# Patient Record
Sex: MALE | Race: OTHER
[De-identification: names, ages, dates, MRNs, and addresses within clinical notes are randomized per-mention and may not be internally consistent; named-entity substitution may affect disease eponyms.]

---

## 2016-12-30 NOTE — RADRPT
PROCEDURE:   XR  Right Hip. 

 

CLINICAL INDICATION:   Trauma.  Right hip pain. 

 

TECHNIQUE:   Two views.  Frontal and lateral. 

 

COMPARISON:   CT scan of the abdomen and pelvis dated 05/23/2016. 

 

FINDINGS:

There is an acute intertrochanteric comminuted fracture of the right hip with marked varus deformity
.  There is no other fracture and there is no dislocation.

There is a large bladder calculus as seen previously measuring 5.0 x 3.3 cm.

Articular surfaces are intact.

There is no lytic or blastic lesion.

There is no radiopaque foreign body. 

 

IMPRESSION:

1.  Acute comminuted intertrochanteric fracture of the right hip with marked varus deformity.

2.  Large bladder calculus as seen on prior CT scan.

3.  Otherwise unremarkable study. 

 

RPTAT: QQ

_____________________________________________ 

.Aguila Lazaro MD, MD           Date    Time 

Electronically viewed and signed by .Aguila Lazaro MD, MD on 12/30/2016 14:24 

 

D:  12/30/2016 14:24  T:  12/30/2016 14:24

.R/

## 2016-12-30 NOTE — ERA
ER Documentation


Chief Complaint


Date/Time


DATE: 12/30/16 


TIME: 14:31


Chief Complaint


right leg pain from a fall 3 days ago . no deformity noted.





HPI


This is 61-year-old male who says he rolled out of bed this morning and landed 

on his right hip.  He is complaining of sharp pain in the right hip/groin 

region.  He said he was not knocked unconscious and did not pass out.  He is 

describing a sharp pain in the right hip area is worse with movement and better 

with rest.  He is not able to bear weight or ambulate.  Denies any neck pain or 

other injury.  Patient has a history of ileostomy and colostomy with a fistula 

to the bladder





ROS


All systems reviewed and are negative except as per history of present illness.





Medications


Home Meds


Active Scripts


Ondansetron Hcl* (Zofran*) 4 Mg Tablet, 4 MG PO Q8H Y for NAUSEA AND/OR VOMITING

, #3 TAB


   Prov:RICHSOHA THOMPSON         5/22/16


Reported Medications


Multivitamins* (Once Daily*) 1 Tab Tablet, 1 TAB PO DAILY, TAB


   6/2/16


Loperamide Hcl* (Loperamide Hcl*) 2 Mg Cap, 2 MG PO BID, CAP


   6/2/16


Hydrocodone Bit-Acetaminophen* (Norco*)  Mg Tablet, 1 TAB PO TID Y for 

PAIN, TAB


   6/2/16


Ciprofloxacin Hcl* (Ciprofloxacin Hcl*) 500 Mg Tablet, 500 MG PO BID, #14 TAB


   THIS IS AN ON GOING MEDICATION


   6/2/16


Amox Tr/Potassium Clavulanate (Amox Tr-K Clv 500-125 Mg Tab) 1 Tab Tablet, 1 

TAB PO TID, #30 TAB


   5/23/16


Sulfamethoxazole/Trimethoprim (Smz-Tmp Ds 800-160 Mg Tablet) 1 Tab Tablet, 1 

TAB PO BID WITH MEALS , TAB


   5/23/16


Quetiapine Fumarate* (Seroquel*) 25 Mg Tablet, 25 MG PO HS, #30 TAB


   5/22/16


Gabapentin* (Gabapentin*) 300 Mg Capsule, 300 MG PO TID, #90 CAP


   5/22/16


Zolpidem Tartrate* (Ambien*) 10 Mg Tablet, 10 MG PO QHS Y for INSOMNIA, TAB


   5/22/16


Mirtazapine* (Remeron*) 15 Mg Tablet, 7.5 MG PO HS, TAB


   5/22/16


Lorazepam* (Ativan*) 0.5 Mg Tablet, 0.5 MG PO TID Y for ANXIETY, #30 TAB


   5/22/16


Aspirin* (Aspirin* Chew) 81 Mg Tab.chew, 81 MG PO DAILY, TAB.CHEW


   5/22/16


Fluconazole* (Fluconazole*) 100 Mg Tablet, 100 MG PO DAILY, TAB


   5/22/16


Tamsulosin Hcl* (Flomax*) 0.4 Mg Cap.er.24h, 0.4 MG PO HS, CAP


   5/22/16


Finasteride* (Finasteride*) 5 Mg Tablet, 5 MG PO DAILY, TAB


   5/22/16


Ferrous Sulfate* (Ferrous Sulfate*) 325 Mg Tabec, 325 MG PO TID, TAB


   5/22/16


Discontinued Scripts


Cephalexin* (Cephalexin*) 500 Mg Capsule, 500 MG PO Q6, #40 CAP


   Prov:HAYDEE SAINI PA-C         8/24/16


Sulfamethoxazole-Trimethoprim* (Bactrim* DS) 800-160 Mg Tab, 1 TAB PO BID, #20 

TAB


   Prov:HAYDEE SAINI PA-C         8/24/16





Allergies


Allergies:  


Coded Allergies:  


     No Known Allergy (Unverified , 5/26/16)





PMhx/Soc


History of Surgery:  Yes (COLOSTOMY AND OSTOMY 2013)


Anesthesia Reaction:  No


Hx Neurological Disorder:  No


Hx Respiratory Disorders:  No


Hx Cardiac Disorders:  No


Hx Psychiatric Problems:  No


Hx Miscellaneous Medical Probl:  Yes (kidney stone, BLADDER INFECTION )


Hx Alcohol Use:  Yes (quit 35 yrs ago)


Hx Substance Use:  No


Hx Tobacco Use:  No





FmHx


Family History:  No coronary disease





Physical Exam


Vitals





Vital Signs








  Date Time  Temp Pulse Resp B/P Pulse Ox O2 Delivery O2 Flow Rate FiO2


 


12/30/16 14:32 98.5 74 20 129/68 98 Room Air  


 


12/30/16 13:20 98.5 80 20 127/80 97   








Physical Exam


Const:      Well-developed, well-nourished


 Head:        Atraumatic, normocephalic


 Eyes:       Normal Conjunctiva, PERRLA, EOMI, normal sclera, no nystagmus


 ENT:         Normal External Ears, Nose and Mouth, moist mucus membranes.


 Neck:        Full range of motion.  No meningismus, no lymphadenopathy.


 Resp:         Clear to auscultation bilaterally, no wheezing, rhonchi, rales


 Cardio:       Regular rate and rhythm, no murmurs, S1 S2 present


 Abd:         Soft, non tender x 4, non distended.  Ileostomy and colostomy bag 

in place, normal bowel sounds, no guarding or rebound, no pulsitile abdominal 

masses or bruits


 Skin:         No petechiae or rashes, no ecchymosis , no maculopapular rash


 Back:        No midline or flank tenderness


 Ext:          No cyanosis, or edema, FROM x 3, tenderness to the right hip 

there is limited range of motion of the hip due to pain, normal inspection, 

neurovascularly intact x 4


 Neur:        Awake and alert, STR 5/5 x 4, sensation intact x 4, no focal 

findings, cerebellum intact


 Psych:        Normal Mood and Affect


Results 24 hrs





 Current Medications








 Medications


  (Trade)  Dose


 Ordered  Sig/Doug


 Route


 PRN Reason  Start Time


 Stop Time Status Last Admin


Dose Admin


 


 Acetaminophen/


 Hydrocodone Bitart


  (Norco (5/325))  2 tab  ONCE  ONCE


 PO


   12/30/16 13:30


 12/30/16 13:31 DC 12/30/16 13:43


 


 


 Hydromorphone HCl


  (Dilaudid)  1 mg  ONCE  STAT


 IV


   12/30/16 14:27


 12/30/16 14:30 DC  


 


 


 Ondansetron HCl


  (Zofran Inj)  4 mg  ONCE  STAT


 IV


   12/30/16 14:27


 12/30/16 14:30 DC  


 











Procedures/MDM


PROCEDURE:   XR  Right Hip. 


 


CLINICAL INDICATION:   Trauma.  Right hip pain. 


 


TECHNIQUE:   Two views.  Frontal and lateral. 


 


COMPARISON:   CT scan of the abdomen and pelvis dated 05/23/2016. 


 


FINDINGS:


There is an acute intertrochanteric comminuted fracture of the right hip with 

marked varus deformity.  There is no other fracture and there is no dislocation.


There is a large bladder calculus as seen previously measuring 5.0 x 3.3 cm.


Articular surfaces are intact.


There is no lytic or blastic lesion.


There is no radiopaque foreign body. 


 


IMPRESSION:


1.  Acute comminuted intertrochanteric fracture of the right hip with marked 

varus deformity.


2.  Large bladder calculus as seen on prior CT scan.


3.  Otherwise unremarkable study. 


 


RPTAT: QQ


_____________________________________________ 


.Aguila Lazaro MD, MD           Date    Time 


Electronically viewed and signed by .Aguila Lazaro MD, MD on 12/30/2016 14:24 


 


D:  12/30/2016 14:24  T:  12/30/2016 14:24


.R/





CC: WENDY ROD DO

















EKG: 


Rate/Rhythm:             Normal Sinus Rhythm,NL intervals


QRS, ST, QT:    NORMAL [place], QRS, QT]


Impression:      NORMAL EKG














[facility] on call and will admit to the hospital for ORIF of the right 

hip





Departure


Diagnosis:  


 Primary Impression:  


 Intertrochanteric fracture of right hip


 Qualified Code:  S72.141A - Intertrochanteric fracture of right hip, closed, 

initial encounter


Condition:  Stable











WENDY ROD DO Dec 30, 2016 14:33

## 2016-12-30 NOTE — HP
Date/Time of Note


Date/Time of Note


DATE: 12/30/16 


TIME: 16:03





Assessment/Plan


VTE Prophylaxis


VTE Prophylaxis Intervention:  SCD's





Lines/Catheters


IV Catheter Type (from Nrsg):  Saline Lock





Assessment/Plan


Assessment/Plan


60 yo male with a past medical history of essential hypertension, dyslipidemia, 

ileostomy/colostomy, BPH, iron deficiency anemia, insomnia, who came in 

secondary to sustaining a fall 2 nights ago.





1. Acute comminuted intertrochanteric fracture of the right hip - will admit 

the patient to med/surg, fall/hip precautions, consult ortho, pain management, 

bowel regimen, will obtain cardiac clearance for surgical intervention


2. Leukocytosis - multifactorial - fistula related plus recent hip fall - will 

start on zosyn, blood cultures


3. CKD - will consult nephrology for further evaluation and treatment, avoid 

nephrotoxins, renally adjust medications


4. Anemia of chronic disease - will continue with ferrous sulfate


5. Essential hypertension - hydralazine prn for sbp > 160


6. BPH - continue with finasteride/flomax


7. Colostomy/ileostomy - possible fistula - broaden spec antibiotics


8. Insomnia - continue with remeron


9. Dyslipidemia - continue with statin


10. GI ppx - pepcid


11. DVT ppx - scds





answered all of his questions. as per clinical course. 





this history and physical took greater then 45 minutes to complete





HPI/ROS


Admit Date/Time


Admit Date/Time


12/30/2016, 4:03 pm





Hx of Present Illness


60 yo male with a past medical history of essential hypertension, dyslipidemia, 

ileostomy/colostomy, BPH, iron deficiency anemia, insomnia, who came in 

secondary to sustaining a fall 2 nights ago. He was sleeping, and rolled over 

to the floor, hitting on the right hip. The reason he came in late is because 

he thought he bruised his hip and was able to move his lower extremities. 

Otherwise he denies and chest pain, loss of consciousness, chest pain, 

shortness of breath, headaches, urinary/bowel irregularities, fevers/chills, 

nausea/vomiting/diarrhea/constipation, or other constitutional symptoms. 

Patient did have a recent colostomy, possible fistula, and was started on 

diflucan and bactrim DS. 





ER course: pain management





ROS


14 point review of systems completed, please refer to HPI for any positive 

findings





PMH/Family/Social


Past Medical History


colostomy/ileostomy, insomnia, BPH, iron deficiency anemia,


Medical History:  high cholesterol, hypertension





Past Surgical History


s/p colostomy/ileostomy





Family History


Significant Family History:  hypertension (father)





Social History


Alcohol Use:  sober


Smoking Status:  Current every day smoker


Drug Use:  none





Exam/Review of Systems


Vital Signs


Vitals





 Vital Signs








  Date Time  Temp Pulse Resp B/P Pulse Ox O2 Delivery O2 Flow Rate FiO2


 


12/30/16 15:30  88 20 100/88 98 Room Air  


 


12/30/16 14:32 98.5       











Exam


Exam


Gen Abisai: mild distress 2/2 to right hip pain, AAOx4


HEENT: NC/AT, PERRLA, EOMI, no pharyngeal erythema, no tonsillar exudates, no 

lymphadenopathy, no JVD, no carotid bruits


NECK: supple, no thyromegaly


THORAX: symmetrical, no obvious deformities


CV: S1S2, RRR, no M/G/R


Lungs: CTAB no W/C/R/R


Abd: soft, NT/ND, +BS, no rebound, no guarding, neg HSM, colostomy c/d/i


EXT: right hip truncated, externally rotated, has good proprioception and 

pulses in lower extremity, pain with movement and passive movement, left lower 

extremity no edema/ecchymoses


Neuro: CN II-XII grossly intact, no focal deficits


Psych: good mentation, alert and oriented, good mood and affect


Skin: C/D/I





Labs


Result Diagram:  


12/30/16 1455                                                                  

              12/30/16 1455








Medications


Medications





 Current Medications


Sodium Chloride (NS) 1,000 ml @  80 mls/hr [id number] IV  Last administered on 12/ 30/16at 15:24; Admin Dose 80 MLS/HR;  Start 12/30/16 at 15:15;  Stop 12/31/16 

at 03:44





Procedures


Procedures


Xray right hip


 


IMPRESSION:


1.  Acute comminuted intertrochanteric fracture of the right hip with marked 

varus deformity.


2.  Large bladder calculus as seen on prior CT scan.


3.  Otherwise unremarkable study. 





CT abd/pelvis


IMPRESSION:


 


1.  Moderately dilated multiple loops of proximal small bowel with transition 

in the region of the right lower quadrant ileostomy suggestive of small bowel 

obstruction.  Small bowel follow-through would be helpful for further 

evaluation. Small bowel loops appear to be adherent to the anterior abdominal 

wall as well as to the each other in the pelvis suggesting adhesions.


2.  Left lower quadrant colostomy without complicating features.  


3. Diastasis of the rectus abdominus muscle with small fat containing 

periumbilical hernia.  No drainable fluid collection in the anterior abdominal 

wall. 


4.  Moderate bilateral hydroureteronephrosis without obstructing ureteral 

stone.  Atrophic left kidney.


5.  Large calculus in the urinary bladder.


6.  Colonic diverticulosis without evidence of acute diverticulitis.





CXR


IMPRESSION:


 


Calcification of the thoracic aorta (consistent with atherosclerosis). 


Right diaphragmatic elevation


Bibasilar subsegmental atelectasis











MARCK SALAZAR MD Dec 30, 2016 16:16

## 2016-12-30 NOTE — RADRPT
PROCEDURE:   XR Chest. 

 

CLINICAL INDICATION:   Chest pain 

 

TECHNIQUE:   Chest AP portable 

 

COMPARISON:   None available 

 

FINDINGS:

 

The mediastinal structures are unremarkable.  There is calcification of the thoracic aorta (consiste
nt with atherosclerosis).  The heart is normal in size and configuration.  The pulmonary vascularity
 is normal.  There is right diaphragmatic elevation.  There is bibasilar subsegmental atelectasis.  
No consolidation is identified.  The pleural spaces are unremarkable.  The osseous structures are un
remarkable.  

 

IMPRESSION:

 

Calcification of the thoracic aorta (consistent with atherosclerosis). 

Right diaphragmatic elevation

Bibasilar subsegmental atelectasis

 

 

 

RPTAT: HGDB

_____________________________________________ 

.Nishant Rivera MD, MD           Date    Time 

Electronically viewed and signed by .Nishant Rivera MD, MD on 12/30/2016 15:20 

 

D:  12/30/2016 15:20  T:  12/30/2016 15:20

.B/

## 2016-12-31 NOTE — CONS
DATE OF ADMISSION: 12/30/2016

DATE OF CONSULTATION:  

 

 

 

TYPE OF CONSULTATION:  Nephrology.

 

REASON FOR CONSULTATION:  Acute kidney injury.

 

PHYSICIAN REQUESTING CONSULT:  Dr. Lux

 

HISTORY OF PRESENT ILLNESS:  This is a 61-year-old male with a past medical history of chronic kidne
y disease stage III, with a baseline creatinine around 1.6-2 mg/dL, a history of hypertension, dysli
pidemia, a history of colostomy, BPH, iron deficiency, insomnia, who presented to [facility] after sustaining a fall 2 days ago.  The patient was sleeping, rolled over, then fell on 
his right hip, developing pain.  The patient's symptoms progressed, without improvement.  As a resul
t, he came to the emergency room for evaluation.  Upon arrival the patient had x-rays of the right h
ip, which showed an acute intertrochanteric fracture of the right hip, with varus deformity.  The [facility] also had a CT scan of the abdomen and pelvis which showed dilated proximal small bowel loops, 
suggestive of obstruction, a large calculus in the urinary bladder, and moderate bilateral hydrouret
eronephrosis, without  obstructing ureteral stone, atrophic left kidney.  A chest x-ray was obtained
 which showed calcification of the aorta.  The patient was started on IV fluids and admitted to [facility]/surgical for evaluation.  Overnight the patient was clinically stable. There were no episodes of
 hemoptysis, hematemesis or hematochezia.

 

In terms of the patient's renal history, the patient has underlying chronic kidney disease.  He also
 has a history of an atrophic left kidney.  The patient does not know what his baseline creatinine i
s, or his baseline EGFR, but he denies any recent episodes of rashes, any frothy urine, any dysuria.
  The patient denies any recent NSAID use or any diuretic use.

 

PAST MEDICAL HISTORY:  As stated above, a history of hypertension, a history of BPH, anemia, dyslipi
demia, a history of CKD, a history of colostomy.

 

PAST SURGICAL HISTORY:  Status post colostomy and ileostomy, status post bowel resection.

 

ALLERGIES:  NO KNOWN DRUG ALLERGIES.

 

FAMILY HISTORY:  No family history of kidney disease or heart disease.

 

SOCIAL HISTORY:  He does not drink, smoke or do drugs.

 

MEDICATIONS:  The patient medications have been reviewed.

 

REVIEW OF SYSTEMS:  A 14-point review of systems was conducted.  Pertinent positives as stated in th
e HPI, otherwise negative.

 

PHYSICAL EXAMINATION:

VITAL SIGNS:  Blood pressure is 110/57, respirations 18, pulse 76, temperature 99.3.

HEENT:  Head is normocephalic.

NECK:  Supple.

HEART:  Regular rate.

LUNGS:  Showed diminished breath sounds at the base.

ABDOMEN:  Soft, nontender to palpation.  No rebound or guarding. There is a noted colostomy.

EXTREMITIES:  Negative for clubbing or cyanosis.  No edema.

DERMATOLOGIC:  No rashes.

MUSCULOSKELETAL:  Have no joint effusion.

NEUROLOGIC:  No focal deficits.

 

LABORATORY DATA:  Showed the patient's sodium is 139, potassium 4.7, BUN 34, creatinine 2.13.  White
 count 11.8, hemoglobin 8.1, hematocrit 25.5, platelet count is 578.

 

ASSESSMENT AND PLAN:

1.  Nonoliguric acute kidney injury on top of chronic kidney disease stage III, with a baseline crea
tinine of 1.6-2.0 mg/dL.  Etiology of acute kidney injury is secondary to hemodynamics.  The patient
's renal function has improved after receiving IV fluids.  At this point will check a UA with microa
nalysis.  Check urine electrolytes, check a renal ultrasound to evaluate renal parenchyma.  Otherwis
e continue supportive care, renally dose all meds, avoid nephrotoxins.

2.  Mineral bone disorder.  Monitor calcium and phosphorus levels.  No need for phosphate binders.

3.  Anemia.  Continue to monitor hemoglobin and hematocrit levels. Transfuse PRBC as needed.

4.  Acute right hip fracture.  The patient is being evaluated for possible surgery.  Orthopedic cons
ult is pending.

5.  Leukocytosis.  Etiology may be secondary to infection or stress.  The patient is on empiric anti
biotics.  Will review cultures and monitor.

6.  Hypertension.  Continue the current blood pressure regimen.

7.  Benign prostatic hypertrophy.  Continue the current medical management.

8.  History of colostomy with a previous history of fistula. Continue to monitor closely.  The patie
nt is on broad spectrum antibiotics

9.  Dyslipidemia.  Continue statin therapy.

 

Thank you, Dr. Lux, for this patient's consult.  It will be a pleasure to follow the patient wit
h you throughout the hospital course.

 

 

Dictated By: ANIKET PHAN DO

 

NR/JOHN

DD:    12/31/2016 12:21:17

DT:    12/31/2016 14:20:10

Conf#: [id number]

DID#:  [id number]

## 2016-12-31 NOTE — CONS
DATE OF ADMISSION: 12/30/2016

DATE OF CONSULTATION:  12/31/2016

 

 

 

CHIEF COMPLAINT:  Right hip pain.

 

HISTORY OF PRESENT ILLNESS:  This is a 61-year-old male who states that he fell out of bed 2 days ag
o.  He had pain, but was ambulating until yesterday, when his pain increased.  He is complaining of 
pain in his right groin.  The pain does not radiate.  He is unable to ambulate.  He denies any loss 
of consciousness.  He denies any recent fevers or chills.  He has no other complaints.

 

PAST MEDICAL HISTORY:  Bladder infection, kidney stones.

 

PAST SURGICAL HISTORY:  Colostomy in 2013.

 

MEDICATIONS:  Please see chart.

 

SOCIAL HISTORY:  The patient denies current tobacco, alcohol or drug use.

 

FAMILY HISTORY:  Noncontributory.

 

ALLERGIES:  NO KNOWN DRUG ALLERGIES.

 

PHYSICAL EXAMINATION:

VITAL SIGNS: Temperature 98.5, blood pressure 129/68, pulse of 74, respiratory rate of 20.

GENERAL:  The patient is in no acute distress.

EXTREMITIES:  Right lower extremity, there is shortening of the right lower extremity.  He has pain 
with axial loading. The calf is soft, nontender, with a negative Homans.  He has a palpable dorsalis
 pedis pulse.

 

X-rays of right hip:  Two views of the right hip demonstrated a displaced oblique fracture of the ri
ght intertrochanteric femur.  No other fractures or dislocations are seen.

 

ASSESSMENT: A 61-year-old male who fell, sustaining a right oblique displaced intertrochanteric femu
r fracture.

 

PLAN:  The patient will be nonweightbearing on the right lower extremity.  He will be optimized for 
surgical intervention.  I discussed the risks associated with the surgery, which include, but are no
t limited to, infection, venous thrombosis, pulmonary embolism, damage to neurovascular structures, 
 malunion, nonunion, need for revision surgery, need for blood transfusion, and even death.  The pat
ient understands the risks and would like to proceed with surgery.  I also explained to him that the
re is up to a 35% 1-year mortality.  The plan will be to perform a right hip open reduction internal
 fixation on Monday, 01/02/2017.

 

 

Dictated By: CARMEN DAVIS/JOHN

DD:    12/31/2016 10:57:51

DT:    12/31/2016 13:12:41

Conf#: [id number]

DID#:  [id number]

## 2016-12-31 NOTE — PN
Date/Time of Note


Date/Time of Note


DATE: 12/31/16 


TIME: 08:42





Assessment/Plan


VTE Prophylaxis


VTE Prophylaxis Intervention:  SCD's





Lines/Catheters


IV Catheter Type (from Nrsg):  Peripheral IV





Assessment/Plan


Assessment/Plan


62 yo male with a past medical history of essential hypertension, dyslipidemia, 

ileostomy/colostomy, BPH, iron deficiency anemia, insomnia, who came in 

secondary to sustaining a fall 2 nights ago.





1. Acute comminuted intertrochanteric fracture of the right hip - awaiting 

ortho recs, c/w pain management - surgery


2. Leukocytosis - multifactorial - fistula related plus recent hip fall - will 

start on zosyn, blood cultures - improving


3. CKD - will consult nephrology for further evaluation and treatment, avoid 

nephrotoxins, renally adjust medications - improving - 2/2 ATN


4. Anemia of chronic disease - will continue with ferrous sulfate, dilutional 

vs acute loss - will check occult blood stool - if < 8 g/dL will transfuse 2 U 

PRBCs


5. Essential hypertension - hydralazine prn for sbp > 160


6. BPH - continue with finasteride/flomax


7. Colostomy/ileostomy - possible fistula - broaden spec antibiotics


8. Insomnia - continue with remeron


9. Dyslipidemia - continue with statin


10. GI ppx - pepcid


11. DVT ppx - scds





dispo -f/u recs, check stat H/H, as per clinical course





this progress note took greater than 40 minutes to complete





Subjective


24 Hr Interval Summary


Free Text/Dictation


Patient was admitted yesterday for acute fracture of right hip. He is doing 

well with pain management. No occult blood in stool as per the patient. Spoke 

to him in regards to the care plan. 15 minutes spent.





Exam/Review of Systems


Vital Signs


Vitals





 Vital Signs








  Date Time  Temp Pulse Resp B/P Pulse Ox O2 Delivery O2 Flow Rate FiO2


 


12/31/16 00:42 98.1 94 20 111/65 95 Room Air  














 Intake and Output   


 


 12/30/16 12/30/16 12/31/16





 15:00 23:00 07:00


 


Intake Total  250 ml 1750 ml


 


Output Total   500 ml


 


Balance  250 ml 1250 ml











Exam


Gen Abisai: mild distress 2/2 to right hip pain, AAOx4


HEENT: NC/AT, PERRLA, EOMI, no pharyngeal erythema, no tonsillar exudates, no 

lymphadenopathy, no JVD, no carotid bruits


NECK: supple, no thyromegaly


THORAX: symmetrical, no obvious deformities


CV: S1S2, RRR, no M/G/R


Lungs: CTAB no W/C/R/R


Abd: soft, NT/ND, +BS, no rebound, no guarding, neg HSM, colostomy/ileostomy c/d

/i - fecal matter seen, no blood


EXT: right hip truncated, externally rotated, has good proprioception and 

pulses in lower extremity, pain with movement and passive movement, left lower 

extremity no edema/ecchymoses


Neuro: CN II-XII grossly intact, no focal deficits


Psych: good mentation, alert and oriented, good mood and affect


Skin: C/D/I





Results


Result Diagram:  


12/31/16 0538                                                                  

              12/31/16 0538





Results 24 hrs





Laboratory Tests








Test


  12/30/16


14:55 12/30/16


22:32 12/31/16


05:38


 


Activated Partial


Thromboplast Time 30.0  


  


  


 


 


Alanine Aminotransferase


(ALT/SGPT) 30  


  


  


 


 


Albumin 4.1    


 


Albumin/Globulin Ratio 0.87    


 


Alkaline Phosphatase 180  H  


 


Anion Gap 22  H  18  H


 


Anisocytosis 1+    


 


Aspartate Amino Transf


(AST/SGOT) 22  


  


  


 


 


Blood Morphology Comment       


 


Blood Urea Nitrogen 40  H  34  H


 


Calcium Level 10.2    8.9  


 


Carbon Dioxide Level 23    21  


 


Chloride Level 97    105  


 


Cholesterol Level 304  H  


 


Cholesterol/HDL Ratio 7.2    


 


Creatine Kinase 32   28   


 


Creatine Kinase Index 5.5   5.6   


 


Creatinine 2.68  H  2.13  H


 


Creatinine Kinase MB (Mass) 1.75   1.56   


 


Direct Bilirubin 0.00    


 


Globulin 4.70  H  


 


Glucose Level 113    75  


 


HDL Cholesterol 42    


 


Hematocrit 29.8  L  23.9  L


 


Hemoglobin 9.4  L  7.8  L


 


Hemoglobin A1c 5.5    


 


INR International Normalized


Ratio 0.92  


  


  


 


 


Indirect Bilirubin 0.0    


 


LDL Cholesterol, Calculated 196    


 


Lymphocytes # 1.2    0.9  


 


Lymphocytes % 7.0  L  7.3  L


 


Magnesium Level 2.0    


 


Mean Corpuscular Hemoglobin 24.6  L  25.2  L


 


Mean Corpuscular Hemoglobin


Concent 31.5  L


  


  32.4  


 


 


Mean Corpuscular Volume 78.2  L  77.8  L


 


Mean Platelet Volume 6.2  L  6.2  L


 


Monocytes # 0.9    0.8  


 


Monocytes % 5.0    6.9  


 


Myelocytes # 0.2    


 


Myelocytes % 1.0  H  


 


Neutrophils # 15.5  H  9.9  H


 


Neutrophils % 87.0  H  83.8  H


 


Ovalocytes FEW    


 


Platelet Count 832  #H  578  #H


 


Platelet Estimate


  PLT APPEAR


INCREASED 


  


 


 


Potassium Level 5.3  H  4.7  


 


Prothrombin Time 12.4    


 


Prothrombin Time Ratio 1.0    


 


Red Blood Count 3.81  L  3.08  L


 


Red Cell Distribution Width 17.5  H  17.0  H


 


Sodium Level 137    139  


 


Spherocytes FEW    


 


Stomatocytes     


 


Thyroid Stimulating Hormone


(TSH) 0.996  


  


  


 


 


Total Bilirubin 0.0  L  


 


Total Protein 8.8  H  


 


Triglycerides Level 330  H  


 


Troponin I < 0.012   < 0.012   


 


White Blood Count 17.8  #H  11.8  #H


 


Basophils #   0.0  


 


Basophils %   0.4  


 


Eosinophils #   0.2  


 


Eosinophils %   1.6  


 


Nucleated Red Blood Cells #   0.0  


 


Nucleated Red Blood Cells %   0.0  











Medications


Medications





 Current Medications


Sodium Chloride (NS) 1,000 ml @  75 mls/hr [id number] IV  Last administered on 12/ 31/16at 05:14; Admin Dose 75 MLS/HR;  Start 12/30/16 at 15:53


Ondansetron HCl (Zofran Inj) 4 mg Q6H  PRN IV NAUSEA AND/OR VOMITING;  Start 12/ 30/16 at 16:00


Acetaminophen (Tylenol Tab) 650 mg Q6H  PRN PO PAIN LEVEL 1-3 OR FEVER;  Start 

12/30/16 at 16:00


Acetaminophen/ Hydrocodone Bitart (Norco (5/325)) 1 tab Q6H  PRN PO MODERATE 

PAIN LEVEL 4-6;  Start 12/30/16 at 16:00


Morphine Sulfate (morphine) 2 mg Q4H  PRN IV SEVERE PAIN LEVEL 7-10 Last 

administered on 12/31/16at 05:22; Admin Dose 2 MG;  Start 12/30/16 at 16:00


Docusate Sodium (Colace) 100 mg Q12H  PRN PO CONSTIPATION;  Start 12/30/16 at 16

:00


Famotidine (Pepcid Iv) 20 mg Q12 IV  Last administered on 12/30/16at 21:08; 

Admin Dose 20 MG;  Start 12/30/16 at 21:00


Aspirin (Aspirin) 81 mg DAILY PO ;  Start 12/31/16 at 09:00


Ferrous Sulfate (Ferrous Sulfate (Ec)) 325 mg TID PO  Last administered on 12/30 /16at 21:07; Admin Dose 325 MG;  Start 12/30/16 at 21:00


Finasteride (Proscar) 5 mg DAILY PO ;  Start 12/31/16 at 09:00


Fluconazole (Diflucan) 100 mg DAILY PO ;  Start 12/31/16 at 09:00


Gabapentin (Neurontin) 300 mg TID PO  Last administered on 12/30/16at 21:07; 

Admin Dose 300 MG;  Start 12/30/16 at 21:00


Loperamide HCl (Imodium Cap) 2 mg BID PO  Last administered on 12/30/16at 21:07

; Admin Dose 2 MG;  Start 12/30/16 at 21:00


Lorazepam (Ativan) 0.5 mg TID  PRN PO ANXIETY;  Start 12/30/16 at 16:00


Mirtazapine (Remeron) 7.5 mg HS PO  Last administered on 12/30/16at 21:07; 

Admin Dose 7.5 MG;  Start 12/30/16 at 21:00


Multivitamins Therapeutic (Theragran) 1 tab DAILY PO ;  Start 12/31/16 at 09:00


Quetiapine Fumarate (Seroquel) 25 mg HS PO  Last administered on 12/30/16at 21:

07; Admin Dose 25 MG;  Start 12/30/16 at 21:00


Trimethoprim/ Sulfamethoxazole (Bactrim (Ds)) 1 tab BID PO  Last administered 

on 12/30/16at 21:07; Admin Dose 1 TAB;  Start 12/30/16 at 21:00


Tamsulosin HCl (Flomax) 0.4 mg HS PO  Last administered on 12/30/16at 21:07; 

Admin Dose 0.4 MG;  Start 12/30/16 at 21:00


Zolpidem Tartrate 10 mg 10 mg QHS  PRN PO INSOMNIA Last administered on 12/30/ 16at 21:08; Admin Dose 10 MG;  Start 12/30/16 at 16:00


Piperacillin Sod/ Tazobactam Sod (Zosyn 2.25gm/ 50ml (Pmx)) 50 ml @  100 mls/hr 

Q8 IVPB  Last administered on 12/31/16at 06:53; Admin Dose 100 MLS/HR;  Start 12 /30/16 at 17:30


Influenza Virus Vaccine (Fluzone) 0.5 ml ONCE ONCE IM* ;  Start 12/31/16 at 09:

00;  Stop 12/31/16 at 09:01











MARCK SALAZAR MD Dec 31, 2016 08:49

## 2016-12-31 NOTE — RADRPT
PROCEDURE:   US Retroperitoneum. 

 

CLINICAL INDICATION:   Elevated BUN and creatinine.  Acute kidney injury. 

 

TECHNIQUE:   Multiple sonographic images of the retroperitoneum were obtained.  Evaluation of the ki
dneys and bladder was performed as well as visualization of the aorta and other retroperitoneal stru
ctures using a curved array transducer.  The images were reviewed on a PACS workstation. 

 

COMPARISON:   CT dated 05/23/2016 

 

FINDINGS:

 

The kidneys are well visualized. 

The right kidney measures 9.9 cm in length.  

The left kidney measures 8.8 cm in length. 

The bilateral kidneys are hyperechoic which may indicate medical renal disease.

There is no evidence of renal mass or calculus.

There is mild right hydronephrosis, unchanged.

There is moderate right hydroureter, measuring 1.2 cm, unchanged.

There is cortical thinning of the left kidney, consistent with atrophy, unchanged..   

No perinephric fluid collection is seen.  

The aorta and IVC are of normal caliber.  

There is no evidence for aortic aneurysm.    

 

The bladder is distended and contains a 4.5 cm calculus, increased in size from previous exam.

The ureteral jets are not identified..  

 

 

IMPRESSION:

 

1.  Hyperechoic kidneys, suggesting medical renal disease.

2.  Significant atrophy of the left kidney, unchanged.

3.  Mild right hydronephrosis and hydroureter, unchanged.

4.  4.5 cm calculus in the urinary bladder, increased in size from previous exam.   

 

RPTAT: HLDM

_____________________________________________ 

.Stephen Barbosa MD, MD           Date    Time 

Electronically viewed and signed by .Stephen Barbosa MD, MD on 12/31/2016 21:41 

 

D:  12/31/2016 21:41  T:  12/31/2016 21:41

.M/

## 2016-12-31 NOTE — CONS
Date/Time of Note


Date/Time of Note


DATE: 12/31/16 


TIME: 12:10





Assessment/Plan


Assessment/Plan


Chief Complaint/Hosp Course


1. PREOPERATIVE RISK ASSESSMENT FOR Acute comminuted intertrochanteric fracture 

of the right hip


2. Leukocytosis - multifactorial - fistula related plus recent hip fall - will 

start on zosyn, blood cultures 


3. CKD 


4. Anemia of chronic disease


5. Essential hypertension 


6. BPH


7. Colostomy/ileostomy


8. Insomnia 


9. Dyslipidemia


10. GI ppx


11. DVT


Problems:  


Additional Assessment/Plan


1) Will order echo


2) atorvastatin





Consultation Date/Type/Reason


Admit Date/Time


12/30/2016, 4:03 pm


Date of Consultation:  Dec 31, 2016


Type of Consultation:  cv


Reason for Consultation


preop


Referring Provider:  MARCK SALAZAR MD





Hx of Present Illness


reports that he rolled over during night in bed with subsequent hip fracture. 

States he is able to walk 1 block, no chest pain, no sob, no syncope or near 

syncope , no palpitations


Constitutional:  no complaints


ENT:  no complaints


Respiratory:  no complaints


Cardiovascular:  no complaints


Gastrointestinal:  no complaints


Musculoskeletal:  restricted range of motion


Neurologic:  no complaints





Past Medical History


Medical History:  high cholesterol, hypertension





Family History


Significant Family History:  hypertension





Social History


Alcohol Use:  sober


Smoking Status:  Former smoker


Drug Use:  none





Exam/Review of Systems


Vital Signs


Vitals





 Vital Signs








  Date Time  Temp Pulse Resp B/P Pulse Ox O2 Delivery O2 Flow Rate FiO2


 


12/31/16 07:15 99.3 96 18 110/57 96 Room Air  














 Intake and Output   


 


 12/30/16 12/30/16 12/31/16





 15:00 23:00 07:00


 


Intake Total  250 ml 1750 ml


 


Output Total   500 ml


 


Balance  250 ml 1250 ml











Exam


Constitutional:  alert, oriented


Head:  atraumatic, normocephalic


Neck:  supple


Respiratory:  clear to auscultation


Cardiovascular:  regular rate and rhythm


Gastrointestinal:  soft


Musculoskeletal:  joint tenderness, range of motion


Extremities:  normal pulses





Results


Result Diagram:  


12/31/16 0955                                                                  

              12/31/16 0538





Results 24 hrs





Laboratory Tests








Test


  12/30/16


14:55 12/30/16


22:32 12/31/16


05:38 12/31/16


09:55


 


Activated Partial


Thromboplast Time 30.0  


  


  


  


 


 


Alanine Aminotransferase


(ALT/SGPT) 30  


  


  


  


 


 


Albumin 4.1     


 


Albumin/Globulin Ratio 0.87     


 


Alkaline Phosphatase 180  H   


 


Anion Gap 22  H  18  H 


 


Anisocytosis 1+     


 


Aspartate Amino Transf


(AST/SGOT) 22  


  


  


  


 


 


Blood Morphology Comment        


 


Blood Urea Nitrogen 40  H  34  H 


 


Calcium Level 10.2    8.9   


 


Carbon Dioxide Level 23    21   


 


Chloride Level 97    105   


 


Cholesterol Level 304  H   


 


Cholesterol/HDL Ratio 7.2     


 


Creatine Kinase 32   28    


 


Creatine Kinase Index 5.5   5.6    


 


Creatinine 2.68  H  2.13  H 


 


Creatinine Kinase MB (Mass) 1.75   1.56    


 


Direct Bilirubin 0.00     


 


Globulin 4.70  H   


 


Glucose Level 113    75   


 


HDL Cholesterol 42     


 


Hematocrit 29.8  L  23.9  L 25.5  L


 


Hemoglobin 9.4  L  7.8  L 8.1  L


 


Hemoglobin A1c 5.5     


 


INR International Normalized


Ratio 0.92  


  


  


  


 


 


Indirect Bilirubin 0.0     


 


LDL Cholesterol, Calculated 196     


 


Lymphocytes # 1.2    0.9   


 


Lymphocytes % 7.0  L  7.3  L 


 


Magnesium Level 2.0     


 


Mean Corpuscular Hemoglobin 24.6  L  25.2  L 


 


Mean Corpuscular Hemoglobin


Concent 31.5  L


  


  32.4  


  


 


 


Mean Corpuscular Volume 78.2  L  77.8  L 


 


Mean Platelet Volume 6.2  L  6.2  L 


 


Monocytes # 0.9    0.8   


 


Monocytes % 5.0    6.9   


 


Myelocytes # 0.2     


 


Myelocytes % 1.0  H   


 


Neutrophils # 15.5  H  9.9  H 


 


Neutrophils % 87.0  H  83.8  H 


 


Ovalocytes FEW     


 


Platelet Count 832  #H  578  #H 


 


Platelet Estimate


  PLT APPEAR


INCREASED 


  


  


 


 


Potassium Level 5.3  H  4.7   


 


Prothrombin Time 12.4     


 


Prothrombin Time Ratio 1.0     


 


Red Blood Count 3.81  L  3.08  L 


 


Red Cell Distribution Width 17.5  H  17.0  H 


 


Sodium Level 137    139   


 


Spherocytes FEW     


 


Stomatocytes      


 


Thyroid Stimulating Hormone


(TSH) 0.996  


  


  


  


 


 


Total Bilirubin 0.0  L   


 


Total Protein 8.8  H   


 


Triglycerides Level 330  H   


 


Troponin I < 0.012   < 0.012    


 


White Blood Count 17.8  #H  11.8  #H 


 


Basophils #   0.0   


 


Basophils %   0.4   


 


Eosinophils #   0.2   


 


Eosinophils %   1.6   


 


Nucleated Red Blood Cells #   0.0   


 


Nucleated Red Blood Cells %   0.0   











Medications


Medications





 Current Medications


Sodium Chloride (NS) 1,000 ml @  75 mls/hr [id number] IV  Last administered on 12/ 31/16at 05:14; Admin Dose 75 MLS/HR;  Start 12/30/16 at 15:53


Ondansetron HCl (Zofran Inj) 4 mg Q6H  PRN IV NAUSEA AND/OR VOMITING;  Start 12/ 30/16 at 16:00


Acetaminophen (Tylenol Tab) 650 mg Q6H  PRN PO PAIN LEVEL 1-3 OR FEVER;  Start 

12/30/16 at 16:00


Acetaminophen/ Hydrocodone Bitart (Norco (5/325)) 1 tab Q6H  PRN PO MODERATE 

PAIN LEVEL 4-6;  Start 12/30/16 at 16:00


Morphine Sulfate (morphine) 2 mg Q4H  PRN IV SEVERE PAIN LEVEL 7-10 Last 

administered on 12/31/16at 09:15; Admin Dose 2 MG;  Start 12/30/16 at 16:00


Docusate Sodium (Colace) 100 mg Q12H  PRN PO CONSTIPATION;  Start 12/30/16 at 16

:00


Famotidine (Pepcid Iv) 20 mg Q12 IV  Last administered on 12/31/16at 09:28; 

Admin Dose 20 MG;  Start 12/30/16 at 21:00


Aspirin (Aspirin) 81 mg DAILY PO ;  Start 12/31/16 at 09:00


Ferrous Sulfate (Ferrous Sulfate (Ec)) 325 mg TID PO  Last administered on 12/31 /16at 09:27; Admin Dose 325 MG;  Start 12/30/16 at 21:00


Finasteride (Proscar) 5 mg DAILY PO  Last administered on 12/31/16at 09:27; 

Admin Dose 5 MG;  Start 12/31/16 at 09:00


Fluconazole (Diflucan) 100 mg DAILY PO  Last administered on 12/31/16at 09:28; 

Admin Dose 100 MG;  Start 12/31/16 at 09:00


Gabapentin (Neurontin) 300 mg TID PO  Last administered on 12/31/16at 09:28; 

Admin Dose 300 MG;  Start 12/30/16 at 21:00


Loperamide HCl (Imodium Cap) 2 mg BID PO  Last administered on 12/30/16at 21:07

; Admin Dose 2 MG;  Start 12/30/16 at 21:00


Lorazepam (Ativan) 0.5 mg TID  PRN PO ANXIETY;  Start 12/30/16 at 16:00


Mirtazapine (Remeron) 7.5 mg HS PO  Last administered on 12/30/16at 21:07; 

Admin Dose 7.5 MG;  Start 12/30/16 at 21:00


Multivitamins Therapeutic (Theragran) 1 tab DAILY PO  Last administered on 12/31 /16at 09:27; Admin Dose 1 TAB;  Start 12/31/16 at 09:00


Quetiapine Fumarate (Seroquel) 25 mg HS PO  Last administered on 12/30/16at 21:

07; Admin Dose 25 MG;  Start 12/30/16 at 21:00


Trimethoprim/ Sulfamethoxazole (Bactrim (Ds)) 1 tab BID PO  Last administered 

on 12/31/16at 09:27; Admin Dose 1 TAB;  Start 12/30/16 at 21:00


Tamsulosin HCl (Flomax) 0.4 mg HS PO  Last administered on 12/30/16at 21:07; 

Admin Dose 0.4 MG;  Start 12/30/16 at 21:00


Zolpidem Tartrate 10 mg 10 mg QHS  PRN PO INSOMNIA Last administered on 12/30/ 16at 21:08; Admin Dose 10 MG;  Start 12/30/16 at 16:00


Piperacillin Sod/ Tazobactam Sod (Zosyn 2.25gm/ 50ml (Pmx)) 50 ml @  100 mls/hr 

Q8 IVPB  Last administered on 12/31/16at 06:53; Admin Dose 100 MLS/HR;  Start 12 /30/16 at 17:30





Procedures


Procedures


EKG: Sinustachycardia











MARYAM MNUOZ MD Dec 31, 2016 12:15

## 2017-01-01 NOTE — PN
DATE:  01/01/2017

 

 

SUBJECTIVE:  The patient is stable, no acute events overnight.  No fevers, chills, nausea, vomiting,
 no shortness of breath.

 

OBJECTIVE:

VITAL SIGNS:  Blood pressure is 119/65, respirations 20, pulse 77, temperature 99.6.

HEENT:  Head is normocephalic.

NECK:  Supple.

HEART:  Regular rate.

LUNGS:  Show diminished breath sounds at base.

ABDOMEN:  Soft, nontender to palpation without rebound or guarding.

EXTREMITIES:  Negative for clubbing, cyanosis, no edema.

DERMATOLOGIC:  No rashes.

MUSCULOSKELETAL:  No joint effusions.

NEUROLOGIC:  No change in exam.

 

MEDICATIONS:  The patient's medications have been reviewed.

 

LABORATORY DATA:  Shows sodium 136, potassium 4.8, BUN 27, creatinine 1.94.  White count 11.8, hemog
lobin 6.7, hematocrit 21.2, platelet count is 524.

 

IMAGING:  Renal ultrasound shows hyperechoic kidneys, mild right hydronephrosis.

 

ASSESSMENT AND PLAN:

1.  Nonoliguric acute kidney injury on top of chronic kidney disease stage, history of previous base
line creatinine 1.6 to 2 0 mg/dL.  Etiology of acute kidney injury is secondary to hemodynamics.  Re
nal function is improving with IV fluids.  The patient's renal ultrasound does show mild right-sided
 hydronephrosis.  At this point, continue current treatment plan.  Continue supportive care, renally
 dose all meds, avoid nephrotoxins.  Continue IV hydration.

2.  Mineral bone disorder.  Continue to monitor calcium and phosphorus levels.  No need for phosphat
e binders.

3.  Anemia.  Patient's hemoglobin levels declined likely from intertrochanteric bleed.  Continue to 
monitor H and H levels, transfuse if needed.

4.  Acute right hip fracture.  The patient is being evaluated by orthopedist for possible surgery.  
Continue to monitor.

5.  Leukocytosis.  The patient is on empiric antibiotics, continue to monitor.  Follow up cultures.

6.  Hypertension.  Continue current blood pressure regimen.

7.  Benign prostatic hypertrophy.  Continue medical management.

8.  History of colostomy with history of fistula.  Continue broad spectrum antibiotics.

9.  Dyslipidemia.  Continue statin therapy.

 

 

Dictated By: ANIKET LUCAS/JOHN

DD:    01/01/2017 11:10:36

DT:    01/01/2017 15:23:37

Conf#: [id number]

DID#:  [id number]

## 2017-01-01 NOTE — PN
DATE:  

 

 

SUBJECTIVE:  The patient states that his pain is controlled.  He has been nonweightbearing.  He has 
bilateral SCDs.  He has no complaints.

 

OBJECTIVE:  Right lower extremity pain with axial loading.  His calf is soft, nontender with a negat
claire Homans.  He has palpable dorsalis pedis pulse.

 

IMPRESSION:  A 61-year-old male with a right intertrochanteric femur fracture, and infected fistula 
with colostomy.  

 

PLAN:  I spoke with the [facility] hospitalist.  The patient has an active infection currently.  I explai
vinnie to him that I will not be able to do surgery until the infection has cleared.  He is contacting 
the patient's surgeon at [facility].  The patient may require transfer for further 
care at [facility].  I will continue to follow the patient along.

 

 

Dictated By: CARMEN DAVIS/JOHN

DD:    01/01/2017 13:17:08

DT:    01/01/2017 17:21:14

Conf#: [id number]

DID#:  [id number]

## 2017-01-01 NOTE — CONS
Date/Time of Note


Date/Time of Note


DATE: 1/1/17 


TIME: 17:58





Assessment/Plan


Assessment/Plan


Additional Assessment/Plan


Preoperative cardiac risk stratification


Hypertension


Left ventricular hypertrophy


Hip fracture


Abdominal fistula





-Patient with good exercise capacity, walking anywhere from one to 2 miles on a 

daily basis without exertional chest pain or shortness of breath. 

Echocardiogram with evidence of left ventricular hypertrophy but otherwise 

preserved ejection fraction. Patient at a low to intermediate risk for any 

untoward cardiac events for planned orthopedic surgery. The benefits of surgery 

likely outweigh the risks. Would continue statin therapy if no contraindication 

and restart aspirin when okay by our surgery colleagues.





Consultation Date/Type/Reason


Admit Date/Time


Dec 30, 2016 at 15:15


Initial Consult Date


12/31/16


Type of Consultation:  cv


Referring Provider:  MARCK SALAZAR MD





24 HR Interval Summary


Free Text/Dictation


Denies chest pain, shortness of breath





Exam/Review of Systems


Vital Signs


Vitals





 Vital Signs








  Date Time  Temp Pulse Resp B/P Pulse Ox O2 Delivery O2 Flow Rate FiO2


 


1/1/17 07:43 99.6 77 20 119/65 96   


 


12/31/16 20:00      Room Air  














 Intake and Output   


 


 12/31/16 12/31/16 1/1/17





 15:00 23:00 07:00


 


Intake Total 100 ml 2420 ml 1210 ml


 


Output Total  850 ml 1000 ml


 


Balance 100 ml 1570 ml 210 ml











Exam


No apparent distress


Constitutional:  alert, oriented


Head:  normocephalic


Neck:  supple


Respiratory:  clear to auscultation, normal air movement


Cardiovascular:  other (S1 and S2 heard), regular rate and rhythm


Gastrointestinal:  bowel sounds, non-tender, other (drainage at fistula site), 

soft


Extremities:  other (no edema)





Results


Result Diagram:  


1/1/17 0715                                                                    

            1/1/17 0515





Results 24 hrs





Laboratory Tests








Test


  1/1/17


05:15 1/1/17


07:15


 


Anion Gap 14   


 


Anisocytosis 1+   


 


Basophils # 0.0   


 


Basophils % 0.3   


 


Blood Morphology Comment    


 


Blood Urea Nitrogen 27  H 


 


Calcium Level 9.0   


 


Carbon Dioxide Level 20  L 


 


Chloride Level 107   


 


Creatinine 1.94  H 


 


Differential Comment AUTO w/SCAN   


 


Eosinophils # 0.2   


 


Eosinophils % 2.1   


 


Glucose Level 84   


 


Hematocrit 21.2  L 22.6  L


 


Hemoglobin 6.7  *L 7.2  L


 


Hypochromasia 2+   


 


Lymphocytes # 1.1   


 


Lymphocytes % 9.5  L 


 


Mean Corpuscular Hemoglobin 24.8  L 


 


Mean Corpuscular Hemoglobin


Concent 31.7  L


  


 


 


Mean Corpuscular Volume 78.2  L 


 


Mean Platelet Volume 6.2  L 


 


Microcytosis 1+   


 


Monocytes # 0.9   


 


Monocytes % 7.7   


 


Neutrophils # 9.5  H 


 


Neutrophils % 80.4  H 


 


Nucleated Red Blood Cells # 0.0   


 


Nucleated Red Blood Cells % 0.0   


 


Platelet Count 524  H 


 


Potassium Level 4.8   


 


Red Blood Count 2.71  L 


 


Red Cell Distribution Width 17.5  H 


 


Sodium Level 136   


 


White Blood Count 11.8  H 











Medications


Medications





 Current Medications


Sodium Chloride (NS) 1,000 ml @  75 mls/hr [id number] IV  Last administered on 1/1/ 17at 10:05; Admin Dose 75 MLS/HR;  Start 12/30/16 at 15:53


Ondansetron HCl (Zofran Inj) 4 mg Q6H  PRN IV NAUSEA AND/OR VOMITING;  Start 12/ 30/16 at 16:00


Acetaminophen (Tylenol Tab) 650 mg Q6H  PRN PO PAIN LEVEL 1-3 OR FEVER;  Start 

12/30/16 at 16:00


Acetaminophen/ Hydrocodone Bitart (Norco (5/325)) 1 tab Q6H  PRN PO MODERATE 

PAIN LEVEL 4-6 Last administered on 1/1/17at 00:36; Admin Dose 1 TAB;  Start 12/ 30/16 at 16:00


Morphine Sulfate (morphine) 2 mg Q4H  PRN IV SEVERE PAIN LEVEL 7-10 Last 

administered on 1/1/17at 13:54; Admin Dose 2 MG;  Start 12/30/16 at 16:00


Docusate Sodium (Colace) 100 mg Q12H  PRN PO CONSTIPATION;  Start 12/30/16 at 16

:00


Aspirin (Aspirin) 81 mg DAILY PO ;  Start 12/31/16 at 09:00


Ferrous Sulfate (Ferrous Sulfate (Ec)) 325 mg TID PO  Last administered on 1/1/ 17at 13:54; Admin Dose 325 MG;  Start 12/30/16 at 21:00


Finasteride (Proscar) 5 mg DAILY PO  Last administered on 1/1/17at 09:01; Admin 

Dose 5 MG;  Start 12/31/16 at 09:00


Fluconazole (Diflucan) 100 mg DAILY PO  Last administered on 1/1/17at 09:01; 

Admin Dose 100 MG;  Start 12/31/16 at 09:00


Gabapentin (Neurontin) 300 mg TID PO  Last administered on 1/1/17at 13:53; 

Admin Dose 300 MG;  Start 12/30/16 at 21:00


Loperamide HCl (Imodium Cap) 2 mg BID PO  Last administered on 12/30/16at 21:07

; Admin Dose 2 MG;  Start 12/30/16 at 21:00


Lorazepam (Ativan) 0.5 mg TID  PRN PO ANXIETY;  Start 12/30/16 at 16:00


Mirtazapine (Remeron) 7.5 mg HS PO  Last administered on 12/31/16at 20:35; 

Admin Dose 7.5 MG;  Start 12/30/16 at 21:00


Multivitamins Therapeutic (Theragran) 1 tab DAILY PO  Last administered on 1/1/ 17at 09:01; Admin Dose 1 TAB;  Start 12/31/16 at 09:00


Quetiapine Fumarate (Seroquel) 25 mg HS PO  Last administered on 12/31/16at 20:

35; Admin Dose 25 MG;  Start 12/30/16 at 21:00


Trimethoprim/ Sulfamethoxazole (Bactrim (Ds)) 1 tab BID PO  Last administered 

on 1/1/17at 09:01; Admin Dose 1 TAB;  Start 12/30/16 at 21:00


Tamsulosin HCl (Flomax) 0.4 mg HS PO  Last administered on 12/31/16at 20:35; 

Admin Dose 0.4 MG;  Start 12/30/16 at 21:00


Zolpidem Tartrate (Ambien) 10 mg QHS  PRN PO INSOMNIA Last administered on 12/31 /16at 20:36; Admin Dose 10 MG;  Start 12/30/16 at 16:00


Atorvastatin Calcium (Lipitor) 10 mg HS PO  Last administered on 12/31/16at 20:

34; Admin Dose 10 MG;  Start 12/31/16 at 21:00


Famotidine 20 mg 20 mg DAILY IV ;  Start 1/2/17 at 09:00


Piperacillin Sod/ Tazobactam Sod (Zosyn 2.25gm/ 50ml (Pmx)) 50 ml @  100 mls/hr 

Q6 IVPB  Last administered on 1/1/17at 14:49; Admin Dose 100 MLS/HR;  Start 1/1/ 17 at 13:30











Trey Patricia DO Jan 1, 2017 18:01

## 2017-01-01 NOTE — TS
DATE OF ADMISSION: 12/30/2016

DATE OF DISCHARGE: 01/01/2017

 

DIAGNOSIS ON TRANSFER:

1.  Acute comminuted intertrochanteric fracture of the right hip.

2.  Leukocytosis secondary to #1 and colovesicular fistula.

3.  Colovesicular fistula:  Chronic. The patient had an operation at [facility] in the past.

4.  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  The patient had a colon surgery with a right colostomy and a left
 blind loop.

5.  His surgical history is complicated. 

6.  Anemia of chronic disease.

7.  Essential hypertension.

8.  Benign prostatic hypertrophy.

9.  Insomnia.

10.  Dyslipidemia.

 

HOSPITAL COURSE:  The patient is a 61-year-old male who was admitted by Dr. Lux on 12/30/2016. T
he patient was placed on broad spectrum antibiotics because he had an elevated white blood cell coun
t and right intertrochanteric fracture.  On admission, his white count was 17,800 with a predominanc
e of neutrophils.  He was placed on Zosyn and Bactrim as well as fluconazole.  The patient's white c
ount decreased to 11.8.  As stated above, the patient's white count did decrease to 11.8.  He was al
so found to be anemic on admission with a hemoglobin of 9.4.  His hemoglobin dropped to 7.2 and he w
as transfused 1 unit of PRBC prior to transfer.  The patient was hemodynamically stable with normal 
vital signs at the time of transfer.  He was also not weak or dizzy.

 

The patient also had an elevated creatinine level of 2.68.  He was given intravenous fluid. At the t
thierry of transfer, his creatinine was 1.94.  The rest of his metabolic profile were within normal limi
ts.  The patient was evaluated by orthopedics, nephrology and cardiology.  An echocardiogram demonst
rated that the patient had a left ventricular ejection fraction of 70%, normal right ventricular siz
e and a normal appearance of the tricuspid valve and mild to moderate asymmetrical septal hypertroph
y, but as stated above, his ejection fraction was preserved.

 

I had a lengthy discussion with Dr. Christine, the orthopedist who stated that the patient cannot safely u
[facility] orthopedic surgery at this time as it appears that his fistulas may have contributed to a chr
onic infection.  He has been placed on antibiotics and the patient is afebrile; however, he was relu
ctant to proceed as his concern was the infected hardware that would be placed for the patient's jose
maxime can get infected, which could cause further complications.  As the patient had numerous outpati
ent evaluations by general surgeons in the area and as his case was deemed to be somewhat complicate
d from a surgical standpoint, as he has potentially 2 fistulas, the patient was given the option to 
transfer to [facility] for further surgical evaluation and treatment.  The go
al being to continue with his surgeon that he saw in [place] who were preparing him for surgery on 
his fistulas. They are familiar with this case and the patient was agreeable to transfer.

 

CONDITION ON TRANSFER: Stable. 

 

DISPOSITION:  Transfer to [facility] under the care of Dr. Harley Hoyos. 

 

MEDICATIONS ON TRANSFER:

1.  Acetaminophen 650 mg p.o. every 6 hours p.r.n. pain or temperature greater than 101.

2.  Lipitor 10 mg at bedtime.

3.  Docusate sodium 100 mg p.o. b.i.d.

4.  Pepcid 20 mg IV daily.

5.  Ferrous sulfate 325 mg 3 times a day.

6.  Proscar 5 mg once daily.

7.  Diflucan 100 mg once daily.

8.  Gabapentin 300 mg 3 times a day.

9.  Norco 5/325 one p.o. every 6 hours p.r.n. pain.

10.  Ativan 0.5 mg p.o. 3 times a day p.r.n. anxiety.

11.  Remeron 15 mg at bedtime.

12.  Multivitamin 1 daily.

13.  Zosyn 2.25 mg IV q.6h.  This was dosed by pharmacy.

14.  Seroquel 25 mg once daily at bedtime.

15.  Flomax 0.4 mg p.o. at bedtime.

16.  Bactrim-DS 1 p.o. twice daily.

17.  Ambien 10 mg p.o. at bedtime p.r.n. insomnia.  

18.  I had a discussion with Dr. Harley Hoyos, the accepting physician who agreed to take the patient
.  Arrangements will be made for safe transfer this evening after the patient receives 1 unit PRBC p
rior to transfer.  It should be noted that patient is completely stable with stable vital signs at t
he time of transfer.

 

 

Dictated By: BIMAL DESAI/JOHN

DD:    01/01/2017 15:54:42

DT:    01/01/2017 16:49:54

Conf#: [id number]

DID#:  [id number]

## 2020-12-19 ENCOUNTER — HOSPITAL ENCOUNTER (INPATIENT)
Dept: HOSPITAL 54 - ER | Age: 65
LOS: 9 days | Discharge: NURSING, REHAB OR LONG-TERM CARE | DRG: 871 | End: 2020-12-28
Attending: NURSE PRACTITIONER | Admitting: NURSE PRACTITIONER
Payer: MEDICARE

## 2020-12-19 VITALS — HEIGHT: 69 IN | BODY MASS INDEX: 21.24 KG/M2 | WEIGHT: 143.44 LBS

## 2020-12-19 DIAGNOSIS — I21.A1: ICD-10-CM

## 2020-12-19 DIAGNOSIS — R13.10: ICD-10-CM

## 2020-12-19 DIAGNOSIS — E87.2: ICD-10-CM

## 2020-12-19 DIAGNOSIS — E11.22: ICD-10-CM

## 2020-12-19 DIAGNOSIS — Z87.01: ICD-10-CM

## 2020-12-19 DIAGNOSIS — I50.31: ICD-10-CM

## 2020-12-19 DIAGNOSIS — J96.01: ICD-10-CM

## 2020-12-19 DIAGNOSIS — J12.89: ICD-10-CM

## 2020-12-19 DIAGNOSIS — U07.1: ICD-10-CM

## 2020-12-19 DIAGNOSIS — A41.89: Primary | ICD-10-CM

## 2020-12-19 DIAGNOSIS — N17.0: ICD-10-CM

## 2020-12-19 DIAGNOSIS — N39.0: ICD-10-CM

## 2020-12-19 DIAGNOSIS — I13.0: ICD-10-CM

## 2020-12-19 DIAGNOSIS — J15.9: ICD-10-CM

## 2020-12-19 DIAGNOSIS — G62.9: ICD-10-CM

## 2020-12-19 DIAGNOSIS — E88.09: ICD-10-CM

## 2020-12-19 DIAGNOSIS — I27.20: ICD-10-CM

## 2020-12-19 DIAGNOSIS — R65.20: ICD-10-CM

## 2020-12-19 DIAGNOSIS — F29: ICD-10-CM

## 2020-12-19 DIAGNOSIS — E87.5: ICD-10-CM

## 2020-12-19 DIAGNOSIS — E44.0: ICD-10-CM

## 2020-12-19 DIAGNOSIS — N18.9: ICD-10-CM

## 2020-12-19 DIAGNOSIS — Z93.3: ICD-10-CM

## 2020-12-19 DIAGNOSIS — D64.9: ICD-10-CM

## 2020-12-19 DIAGNOSIS — E78.5: ICD-10-CM

## 2020-12-19 DIAGNOSIS — D68.69: ICD-10-CM

## 2020-12-19 DIAGNOSIS — G47.00: ICD-10-CM

## 2020-12-19 LAB
ALBUMIN SERPL BCP-MCNC: 2.3 G/DL (ref 3.4–5)
ALP SERPL-CCNC: 199 U/L (ref 46–116)
ALT SERPL W P-5'-P-CCNC: 35 U/L (ref 12–78)
AST SERPL W P-5'-P-CCNC: 35 U/L (ref 15–37)
BASOPHILS # BLD AUTO: 0 /CMM (ref 0–0.2)
BASOPHILS NFR BLD AUTO: 0.1 % (ref 0–2)
BILIRUB DIRECT SERPL-MCNC: 0.1 MG/DL (ref 0–0.2)
BILIRUB SERPL-MCNC: 0.2 MG/DL (ref 0.2–1)
BUN SERPL-MCNC: 92 MG/DL (ref 7–18)
CALCIUM SERPL-MCNC: 8.8 MG/DL (ref 8.5–10.1)
CHLORIDE SERPL-SCNC: 99 MMOL/L (ref 98–107)
CK SERPL-CCNC: 408 U/L (ref 39–308)
CO2 SERPL-SCNC: 17 MMOL/L (ref 21–32)
COLOR UR: YELLOW
CREAT SERPL-MCNC: 5.1 MG/DL (ref 0.6–1.3)
CRP SERPL-MCNC: 28.2 MG/DL (ref 0–0.9)
D DIMER PPP FEU-MCNC: 10.59 MG/L(FEU (ref 0.17–0.5)
EOSINOPHIL NFR BLD AUTO: 0 % (ref 0–6)
FERRITIN SERPL-MCNC: 3481 NG/ML (ref 8–388)
GLUCOSE SERPL-MCNC: 129 MG/DL (ref 74–106)
HCT VFR BLD AUTO: 30 % (ref 39–51)
HGB BLD-MCNC: 9.4 G/DL (ref 13.5–17.5)
LYMPHOCYTES NFR BLD AUTO: 0.5 /CMM (ref 0.8–4.8)
LYMPHOCYTES NFR BLD AUTO: 2 % (ref 20–44)
MCHC RBC AUTO-ENTMCNC: 32 G/DL (ref 31–36)
MCV RBC AUTO: 76 FL (ref 80–96)
MONOCYTES NFR BLD AUTO: 0.8 /CMM (ref 0.1–1.3)
MONOCYTES NFR BLD AUTO: 3 % (ref 2–12)
NEUTROPHILS # BLD AUTO: 25 /CMM (ref 1.8–8.9)
NEUTROPHILS NFR BLD AUTO: 94.9 % (ref 43–81)
NT-PROBNP SERPL-MCNC: (no result) PG/ML (ref 0–125)
PH UR STRIP: 5.5 [PH] (ref 5–8)
PLATELET # BLD AUTO: 804 /CMM (ref 150–450)
POTASSIUM SERPL-SCNC: 4.6 MMOL/L (ref 3.5–5.1)
PROT SERPL-MCNC: 8.8 G/DL (ref 6.4–8.2)
PROT UR QL STRIP: 100 MG/DL
RBC # BLD AUTO: 3.92 MIL/UL (ref 4.5–6)
RBC #/AREA URNS HPF: (no result) /HPF (ref 0–2)
SODIUM SERPL-SCNC: 134 MMOL/L (ref 136–145)
UROBILINOGEN UR STRIP-MCNC: 0.2 EU/DL
WBC NRBC COR # BLD AUTO: 26.3 K/UL (ref 4.3–11)

## 2020-12-19 PROCEDURE — G0378 HOSPITAL OBSERVATION PER HR: HCPCS

## 2020-12-19 PROCEDURE — A4217 STERILE WATER/SALINE, 500 ML: HCPCS

## 2020-12-19 PROCEDURE — U0003 INFECTIOUS AGENT DETECTION BY NUCLEIC ACID (DNA OR RNA); SEVERE ACUTE RESPIRATORY SYNDROME CORONAVIRUS 2 (SARS-COV-2) (CORONAVIRUS DISEASE [COVID-19]), AMPLIFIED PROBE TECHNIQUE, MAKING USE OF HIGH THROUGHPUT TECHNOLOGIES AS DESCRIBED BY CMS-2020-01-R: HCPCS

## 2020-12-19 RX ADMIN — PIPERACILLIN SODIUM AND TAZOBACTAM SODIUM SCH MLS/HR: .25; 2 INJECTION, POWDER, LYOPHILIZED, FOR SOLUTION INTRAVENOUS at 17:30

## 2020-12-19 RX ADMIN — HEPARIN SODIUM SCH UNITS: 5000 INJECTION INTRAVENOUS; SUBCUTANEOUS at 21:35

## 2020-12-19 RX ADMIN — Medication PRN TAB: at 23:14

## 2020-12-19 RX ADMIN — HEPARIN SODIUM SCH UNITS: 5000 INJECTION INTRAVENOUS; SUBCUTANEOUS at 14:27

## 2020-12-19 RX ADMIN — SODIUM CHLORIDE PRN MLS/HR: 9 INJECTION, SOLUTION INTRAVENOUS at 18:00

## 2020-12-19 NOTE — NUR
REC'D PT IN BED, AAOX4, ON 15L NON RBR, TOLERATED, ASPIRATION PRECAUTION 
OBSERVED, +COLOSTOMY, NOTED TACHY, CALL LIGHT WITHIN REACH, WILL CONT TO 
MONITOR.

## 2020-12-19 NOTE — NUR
[id number], from care facility, c/o cough, loss of smell and taste,02desat 79% on 
RA, 98% on 15lpm via non reabreather mask, Dx PNA 2 weeks ago. vs checked. iv 
access started. blood draw done. sent to lab

## 2020-12-20 VITALS — DIASTOLIC BLOOD PRESSURE: 72 MMHG | SYSTOLIC BLOOD PRESSURE: 118 MMHG

## 2020-12-20 VITALS — SYSTOLIC BLOOD PRESSURE: 120 MMHG | DIASTOLIC BLOOD PRESSURE: 76 MMHG

## 2020-12-20 LAB
ALBUMIN SERPL BCP-MCNC: 2 G/DL (ref 3.4–5)
ALP SERPL-CCNC: 164 U/L (ref 46–116)
ALT SERPL W P-5'-P-CCNC: 30 U/L (ref 12–78)
AST SERPL W P-5'-P-CCNC: 32 U/L (ref 15–37)
BASOPHILS # BLD AUTO: 0 /CMM (ref 0–0.2)
BASOPHILS NFR BLD AUTO: 0.1 % (ref 0–2)
BILIRUB SERPL-MCNC: 0.3 MG/DL (ref 0.2–1)
BUN SERPL-MCNC: 72 MG/DL (ref 7–18)
CALCIUM SERPL-MCNC: 8.9 MG/DL (ref 8.5–10.1)
CHLORIDE SERPL-SCNC: 105 MMOL/L (ref 98–107)
CHOLEST SERPL-MCNC: 184 MG/DL (ref ?–200)
CO2 SERPL-SCNC: 17 MMOL/L (ref 21–32)
CREAT SERPL-MCNC: 3.5 MG/DL (ref 0.6–1.3)
EOSINOPHIL NFR BLD AUTO: 0.1 % (ref 0–6)
GLUCOSE SERPL-MCNC: 112 MG/DL (ref 74–106)
HCT VFR BLD AUTO: 29 % (ref 39–51)
HDLC SERPL-MCNC: 30 MG/DL (ref 40–60)
HGB BLD-MCNC: 9.1 G/DL (ref 13.5–17.5)
IRON SERPL-MCNC: 14 UG/DL (ref 50–175)
LDLC SERPL DIRECT ASSAY-MCNC: 103 MG/DL (ref 0–99)
LYMPHOCYTES NFR BLD AUTO: 0.5 /CMM (ref 0.8–4.8)
LYMPHOCYTES NFR BLD AUTO: 2.3 % (ref 20–44)
MAGNESIUM SERPL-MCNC: 2.1 MG/DL (ref 1.8–2.4)
MCHC RBC AUTO-ENTMCNC: 32 G/DL (ref 31–36)
MCV RBC AUTO: 76 FL (ref 80–96)
MONOCYTES NFR BLD AUTO: 0.5 /CMM (ref 0.1–1.3)
MONOCYTES NFR BLD AUTO: 2.5 % (ref 2–12)
NEUTROPHILS # BLD AUTO: 20.3 /CMM (ref 1.8–8.9)
NEUTROPHILS NFR BLD AUTO: 95 % (ref 43–81)
PHOSPHATE SERPL-MCNC: 3.8 MG/DL (ref 2.5–4.9)
PLATELET # BLD AUTO: 759 /CMM (ref 150–450)
POTASSIUM SERPL-SCNC: 4 MMOL/L (ref 3.5–5.1)
PROT SERPL-MCNC: 8.4 G/DL (ref 6.4–8.2)
RBC # BLD AUTO: 3.79 MIL/UL (ref 4.5–6)
SODIUM SERPL-SCNC: 139 MMOL/L (ref 136–145)
TIBC SERPL-MCNC: 143 UG/DL (ref 250–450)
TRIGL SERPL-MCNC: 205 MG/DL (ref 30–150)
TSH SERPL DL<=0.005 MIU/L-ACNC: 0.36 UIU/ML (ref 0.36–3.74)
WBC NRBC COR # BLD AUTO: 21.4 K/UL (ref 4.3–11)

## 2020-12-20 RX ADMIN — QUETIAPINE SCH MG: 25 TABLET, FILM COATED ORAL at 21:04

## 2020-12-20 RX ADMIN — PIPERACILLIN SODIUM AND TAZOBACTAM SODIUM SCH MLS/HR: .25; 2 INJECTION, POWDER, LYOPHILIZED, FOR SOLUTION INTRAVENOUS at 00:15

## 2020-12-20 RX ADMIN — PIPERACILLIN SODIUM AND TAZOBACTAM SODIUM SCH MLS/HR: .25; 2 INJECTION, POWDER, LYOPHILIZED, FOR SOLUTION INTRAVENOUS at 13:00

## 2020-12-20 RX ADMIN — Medication PRN TAB: at 13:05

## 2020-12-20 RX ADMIN — Medication PRN TAB: at 08:25

## 2020-12-20 RX ADMIN — GABAPENTIN SCH MG: 300 CAPSULE ORAL at 13:05

## 2020-12-20 RX ADMIN — ZOLPIDEM TARTRATE PRN MG: 5 TABLET, FILM COATED ORAL at 01:16

## 2020-12-20 RX ADMIN — Medication PRN TAB: at 18:36

## 2020-12-20 RX ADMIN — AMLODIPINE BESYLATE SCH MG: 10 TABLET ORAL at 11:00

## 2020-12-20 RX ADMIN — PIPERACILLIN SODIUM AND TAZOBACTAM SODIUM SCH MLS/HR: .25; 2 INJECTION, POWDER, LYOPHILIZED, FOR SOLUTION INTRAVENOUS at 18:44

## 2020-12-20 RX ADMIN — HEPARIN SODIUM SCH UNITS: 5000 INJECTION INTRAVENOUS; SUBCUTANEOUS at 09:22

## 2020-12-20 RX ADMIN — PIPERACILLIN SODIUM AND TAZOBACTAM SODIUM SCH MLS/HR: .25; 2 INJECTION, POWDER, LYOPHILIZED, FOR SOLUTION INTRAVENOUS at 07:33

## 2020-12-20 RX ADMIN — GABAPENTIN SCH MG: 300 CAPSULE ORAL at 18:07

## 2020-12-20 RX ADMIN — DEXAMETHASONE SODIUM PHOSPHATE SCH MG: 10 INJECTION INTRAMUSCULAR; INTRAVENOUS at 08:12

## 2020-12-20 RX ADMIN — HEPARIN SODIUM SCH UNITS: 5000 INJECTION INTRAVENOUS; SUBCUTANEOUS at 21:00

## 2020-12-20 RX ADMIN — SODIUM CHLORIDE PRN MLS/HR: 9 INJECTION, SOLUTION INTRAVENOUS at 13:09

## 2020-12-20 RX ADMIN — Medication SCH MG: at 08:13

## 2020-12-20 RX ADMIN — SODIUM CHLORIDE PRN MLS/HR: 9 INJECTION, SOLUTION INTRAVENOUS at 18:21

## 2020-12-20 RX ADMIN — DEXTROSE MONOHYDRATE SCH MLS/HR: 50 INJECTION, SOLUTION INTRAVENOUS at 14:31

## 2020-12-20 RX ADMIN — FERROUS SULFATE TAB 325 MG (65 MG ELEMENTAL FE) SCH MG: 325 (65 FE) TAB at 18:07

## 2020-12-20 NOTE — NUR
RN OPENING NOTE



RECEIVED PATIENT IN BED RESTING ALERT ORIENTED X4 VERBALLY RESPONSIVE ON HIGH FLOW OXYGEN 
40L O2:96% IV SITE IS ON LEFT FOREARM,AND RIGHT AC INTACT PATENT,ON IV HYDRATION NS 
50CC/HR,HAS COLOSTOMY BAG STOOL IS BLACK AND LIQUID,CALL LIGHT WITHIN REACH,BED IN LOW 
POSITION AND LOCKED,SAFETY MEASURE IMPLEMENT CONTINUE TO MONITOR.

## 2020-12-20 NOTE — NUR
Patient is alert and oriented, resides at [facility] [phone]. Prior to 
admission he was ambulating with a walker and independent with adl's. Tested positive for 
COVID. Spoke with [facility] at [facility], they will be able to take patient back when 
discharge. 






-------------------------------------------------------------------------------

Addendum: 12/20/20 at 2158 by ROSAS ALBARRAN RN

-------------------------------------------------------------------------------

Amended: Links added.

## 2020-12-20 NOTE — NUR
RT



Stat ABG requested for pt on a %. ABG values were within normal range. Dr. Soto 
ordered pt on HFNC. Pt placed on Vapotherm HFNC 30 lpm 100% with SpO2 96%.

## 2020-12-20 NOTE — NUR
pt noted dessating on 15l nrb, o2 sats noted at low 80s. sharda np notified, 
stat abg's ordered and stat chest xray. rt called.

## 2020-12-20 NOTE — NUR
RT



HFNC brought to room #120. Placed pt back on HFNC 30 lpm 100%. Pt awake and alert. Will 
continue to monitor.

## 2020-12-20 NOTE — NUR
RN NOTE



PATIENT ARRIVED VIA GURNEY FROM ER. PATIENT SHOWS NO S/S OF DISTRESS AT THIS TIME. CURRENTLY 
ON HIGH FLOW OXYGEN AT 40L, TOLERATING WELL. PATIENT HAS AN IV ON L FOREARM AND R AC, INTACT 
AND PATENT. SALINE FLUSH DONE. NO S/S OF INFECTION AT THIS TIME. COLOSTOMY BAG INTACT, STOMA 
PINK. WILL CONTINUE TO MONITOR AND PROVIDE CARE.

## 2020-12-20 NOTE — NUR
RN CLOSING NOTE



PATIENT SHOWS NO S/S OF DISTRESS AT THIS TIME. CURRENTLY ON HIGH FLOW OXYGEN AT 40L, 
TOLERATING WELL. PATIENT HAS AN IV ON L FOREARM AND R AC, INTACT AND PATENT. SALINE FLUSH 
DONE. PATIENT CURRENTLY HAS IV FLUIDS 0.9% NS RUNNING AT 50ML/HR. NO S/S OF INFECTION AT 
THIS TIME. COLOSTOMY BAG INTACT, STOMA PINK. WILL ENDORSE TO NIGHT SHIFT FOR CAROLA.

## 2020-12-21 VITALS — SYSTOLIC BLOOD PRESSURE: 136 MMHG | DIASTOLIC BLOOD PRESSURE: 84 MMHG

## 2020-12-21 VITALS — SYSTOLIC BLOOD PRESSURE: 130 MMHG | DIASTOLIC BLOOD PRESSURE: 84 MMHG

## 2020-12-21 VITALS — DIASTOLIC BLOOD PRESSURE: 74 MMHG | SYSTOLIC BLOOD PRESSURE: 121 MMHG

## 2020-12-21 VITALS — SYSTOLIC BLOOD PRESSURE: 126 MMHG | DIASTOLIC BLOOD PRESSURE: 92 MMHG

## 2020-12-21 VITALS — DIASTOLIC BLOOD PRESSURE: 84 MMHG | SYSTOLIC BLOOD PRESSURE: 136 MMHG

## 2020-12-21 LAB
ALBUMIN SERPL BCP-MCNC: 1.9 G/DL (ref 3.4–5)
ALP SERPL-CCNC: 137 U/L (ref 46–116)
ALT SERPL W P-5'-P-CCNC: 21 U/L (ref 12–78)
AST SERPL W P-5'-P-CCNC: 15 U/L (ref 15–37)
BASOPHILS # BLD AUTO: 0 /CMM (ref 0–0.2)
BASOPHILS NFR BLD AUTO: 0.2 % (ref 0–2)
BILIRUB SERPL-MCNC: 0.3 MG/DL (ref 0.2–1)
BUN SERPL-MCNC: 60 MG/DL (ref 7–18)
CALCIUM SERPL-MCNC: 9.3 MG/DL (ref 8.5–10.1)
CHLORIDE SERPL-SCNC: 110 MMOL/L (ref 98–107)
CK SERPL-CCNC: 46 U/L (ref 39–308)
CO2 SERPL-SCNC: 17 MMOL/L (ref 21–32)
CREAT SERPL-MCNC: 2.5 MG/DL (ref 0.6–1.3)
EOSINOPHIL NFR BLD AUTO: 0 % (ref 0–6)
FERRITIN SERPL-MCNC: 2401 NG/ML (ref 8–388)
GLUCOSE SERPL-MCNC: 159 MG/DL (ref 74–106)
HCT VFR BLD AUTO: 29 % (ref 39–51)
HGB BLD-MCNC: 9.1 G/DL (ref 13.5–17.5)
LYMPHOCYTES NFR BLD AUTO: 0.3 /CMM (ref 0.8–4.8)
LYMPHOCYTES NFR BLD AUTO: 2.4 % (ref 20–44)
MAGNESIUM SERPL-MCNC: 2.2 MG/DL (ref 1.8–2.4)
MCHC RBC AUTO-ENTMCNC: 32 G/DL (ref 31–36)
MCV RBC AUTO: 75 FL (ref 80–96)
MONOCYTES NFR BLD AUTO: 0.3 /CMM (ref 0.1–1.3)
MONOCYTES NFR BLD AUTO: 2.4 % (ref 2–12)
NEUTROPHILS # BLD AUTO: 12.9 /CMM (ref 1.8–8.9)
NEUTROPHILS NFR BLD AUTO: 95 % (ref 43–81)
PHOSPHATE SERPL-MCNC: 3.3 MG/DL (ref 2.5–4.9)
PLATELET # BLD AUTO: 717 /CMM (ref 150–450)
POTASSIUM SERPL-SCNC: 4 MMOL/L (ref 3.5–5.1)
PROT SERPL-MCNC: 8.1 G/DL (ref 6.4–8.2)
RBC # BLD AUTO: 3.82 MIL/UL (ref 4.5–6)
SODIUM SERPL-SCNC: 144 MMOL/L (ref 136–145)
WBC NRBC COR # BLD AUTO: 13.6 K/UL (ref 4.3–11)

## 2020-12-21 RX ADMIN — HEPARIN SODIUM SCH UNITS: 5000 INJECTION INTRAVENOUS; SUBCUTANEOUS at 20:50

## 2020-12-21 RX ADMIN — PIPERACILLIN SODIUM AND TAZOBACTAM SODIUM SCH MLS/HR: .25; 2 INJECTION, POWDER, LYOPHILIZED, FOR SOLUTION INTRAVENOUS at 00:01

## 2020-12-21 RX ADMIN — GABAPENTIN SCH MG: 300 CAPSULE ORAL at 12:20

## 2020-12-21 RX ADMIN — VALSARTAN SCH MG: 80 TABLET ORAL at 21:40

## 2020-12-21 RX ADMIN — HEPARIN SODIUM SCH UNITS: 5000 INJECTION INTRAVENOUS; SUBCUTANEOUS at 10:05

## 2020-12-21 RX ADMIN — Medication PRN TAB: at 22:20

## 2020-12-21 RX ADMIN — Medication SCH MG: at 10:07

## 2020-12-21 RX ADMIN — DEXAMETHASONE SODIUM PHOSPHATE SCH MG: 10 INJECTION INTRAMUSCULAR; INTRAVENOUS at 10:00

## 2020-12-21 RX ADMIN — DEXTROSE MONOHYDRATE SCH MLS/HR: 50 INJECTION, SOLUTION INTRAVENOUS at 15:21

## 2020-12-21 RX ADMIN — FERROUS SULFATE TAB 325 MG (65 MG ELEMENTAL FE) SCH MG: 325 (65 FE) TAB at 17:26

## 2020-12-21 RX ADMIN — GABAPENTIN SCH MG: 300 CAPSULE ORAL at 17:26

## 2020-12-21 RX ADMIN — SODIUM CHLORIDE PRN MLS/HR: 9 INJECTION, SOLUTION INTRAVENOUS at 15:19

## 2020-12-21 RX ADMIN — GABAPENTIN SCH MG: 300 CAPSULE ORAL at 10:06

## 2020-12-21 RX ADMIN — Medication PRN TAB: at 10:32

## 2020-12-21 RX ADMIN — PIPERACILLIN SODIUM AND TAZOBACTAM SODIUM SCH MLS/HR: .25; 2 INJECTION, POWDER, LYOPHILIZED, FOR SOLUTION INTRAVENOUS at 12:20

## 2020-12-21 RX ADMIN — Medication PRN TAB: at 17:26

## 2020-12-21 RX ADMIN — CEFEPIME HYDROCHLORIDE SCH MLS/HR: 1 INJECTION, POWDER, FOR SOLUTION INTRAMUSCULAR; INTRAVENOUS at 17:43

## 2020-12-21 RX ADMIN — AMLODIPINE BESYLATE SCH MG: 10 TABLET ORAL at 09:59

## 2020-12-21 RX ADMIN — PIPERACILLIN SODIUM AND TAZOBACTAM SODIUM SCH MLS/HR: .25; 2 INJECTION, POWDER, LYOPHILIZED, FOR SOLUTION INTRAVENOUS at 05:35

## 2020-12-21 RX ADMIN — QUETIAPINE SCH MG: 25 TABLET, FILM COATED ORAL at 21:02

## 2020-12-21 RX ADMIN — FERROUS SULFATE TAB 325 MG (65 MG ELEMENTAL FE) SCH MG: 325 (65 FE) TAB at 10:07

## 2020-12-21 NOTE — NUR
TELE/RN NOTES

PATIENT COMPLAINED OF GENERALIZED PAIN RATED 7/10 NORCO 325MG  1 TAB P.O WAS GIVEN. WILL 
CONTINUE TO MONITOR.

## 2020-12-21 NOTE — NUR
TELE/RN NOTES:



PT. COMPLAINS OF 8/10 GEN. PAIN. REQUESTED NORCO. VSS AND WNL. ADMINISTERED NORCO 1 TAB AS 
ORDERED FOR PAIN Q4. WILL CONT. TO MONITOR.

## 2020-12-21 NOTE — NUR
TELE/RN OPENING NOTES:



RECEIVED PATIENT ON BED, A/O X4. DENIES PAIN AT THIS TIME. IN NO APPARENT RESPIRATORY 
DISTRESS NOTED. PATIENT IS ON HIGH FLOW OXYGEN at 40L FIO2 %, SATURATING AT 99%. TELE 
MONITOR READING SINUS RHYTHM 96 BPM. NO C/O PAIN AT THIS TIME. NOTED WITH COLOSTOMY BAG. 
SAFETY PRECAUTIONS WAS IN PLACED. BED IN LOWEST AND LOCKED POSITION. SIDE RAILS UP X2. CALL 
LIGHT WITHIN REACH. WILL CONT. TO MONITOR ACCORDINGLY.

## 2020-12-21 NOTE — NUR
RN CLOSING NOTE



PATIENT REMAINS ON ALERT ORIENTED X4 VERBALLY RESPONSIVE NO SOB NOT ACUTE DISTRESS NOTED ON 
HIGH FLOW OXYGEN 40L O2:98% IV SITE IS ON  LEFT FOREARM AND RIGHT AC INTACT PATENT, ON IV 
HYDRATION, NS 50CC/HR ALL DUE MEDS GIVEN AS MD ORDERED,KEPT CLEAN AND DRY ALL THE TIME,ALL 
NEEDS MET ENDORSE NEXT COMING SHIFT FOR CONTINUATION OF CARE.

## 2020-12-21 NOTE — NUR
TELE/RN CLOSING NOTES

PATIENT IS ON BED, ALERT AND ORIENTED X4. PATIENT DENIES PAIN AT THIS TIME. PATIENT IN NO 
APPARENT RESPIRATORY DISTRESS NOTED. PATIENT IS ON HIGH FLOW OXYGEN at 90L SATURATION 99%. 
TELE MONITOR READING SINUS TACHY 110 BPM. SEEN AND EXAMINED BY MD WITH ORDERS MADE AND 
CARRIED OUT. ALL DUE MEDICATIONS WAS GIVEN. SAFETY PRECAUTIONS WAS IN PLACED. BED IN LOWEST 
AND LOCKED POSITION. SIDERAILS UP X2. CALL LIGHT WITHIN REACH. WILL ENDORSED TO NIGHT SHIFT 
FOR CAROLA.

## 2020-12-21 NOTE — NUR
TELE/RN NOTES

PATIENT COMPLAINED OF GENERALIZED PAIN RATED 8/10 NORCO 325MG  1 TAB P.O WAS GIVEN. WILL 
CONTINUE TO MONITOR.

## 2020-12-21 NOTE — NUR
TELE/RN NOTES

PATIENT REFUSED TO TAKE VITAL SIGN AT 1600. EXPLAINED THE RISK AND BENEFITS. WILL CONTINUE 
TO MONITOR.

## 2020-12-21 NOTE — NUR
TELE/RN OPENING NOTES

RECEIVED PATIENT ON BED, AWAKE, ALERT AND ORIENTED X4. PATIENT IN NO APPARENT  RESPIRATORY 
DISTRESS NOTED. NO COMPLAINED OF PAIN AT THIS TIME. ON HIGH FLOW OXYGEN AT 90 L/MIN 
TOLERATING WELL. TELE MONITOR READING SINUS TACHY 110 BPM. WILL CONTINUE TO MONITOR.

## 2020-12-22 VITALS — SYSTOLIC BLOOD PRESSURE: 131 MMHG | DIASTOLIC BLOOD PRESSURE: 72 MMHG

## 2020-12-22 VITALS — SYSTOLIC BLOOD PRESSURE: 135 MMHG | DIASTOLIC BLOOD PRESSURE: 89 MMHG

## 2020-12-22 VITALS — DIASTOLIC BLOOD PRESSURE: 79 MMHG | SYSTOLIC BLOOD PRESSURE: 130 MMHG

## 2020-12-22 VITALS — DIASTOLIC BLOOD PRESSURE: 81 MMHG | SYSTOLIC BLOOD PRESSURE: 128 MMHG

## 2020-12-22 VITALS — SYSTOLIC BLOOD PRESSURE: 132 MMHG | DIASTOLIC BLOOD PRESSURE: 80 MMHG

## 2020-12-22 VITALS — SYSTOLIC BLOOD PRESSURE: 123 MMHG | DIASTOLIC BLOOD PRESSURE: 80 MMHG

## 2020-12-22 LAB
ALBUMIN SERPL ELPH-MCNC: 2.2 G/DL (ref 2.9–4.4)
ALBUMIN/GLOB SERPL: 0.5 {RATIO} (ref 0.7–1.7)
ALPHA1 GLOB SERPL ELPH-MCNC: 0.7 G/DL (ref 0–0.4)
ALPHA2 GLOB SERPL ELPH-MCNC: 1.4 G/DL (ref 0.4–1)
B-GLOBULIN SERPL ELPH-MCNC: 1.3 G/DL (ref 0.7–1.3)
BASOPHILS # BLD AUTO: 0 /CMM (ref 0–0.2)
BASOPHILS NFR BLD AUTO: 0.1 % (ref 0–2)
BUN SERPL-MCNC: 60 MG/DL (ref 7–18)
CALCIUM SERPL-MCNC: 9.5 MG/DL (ref 8.5–10.1)
CHLORIDE SERPL-SCNC: 108 MMOL/L (ref 98–107)
CO2 SERPL-SCNC: 17 MMOL/L (ref 21–32)
CREAT SERPL-MCNC: 1.9 MG/DL (ref 0.6–1.3)
EOSINOPHIL NFR BLD AUTO: 0 % (ref 0–6)
FERRITIN SERPL-MCNC: 2066 NG/ML (ref 8–388)
GAMMA GLOB SERPL ELPH-MCNC: 1.5 G/DL (ref 0.4–1.8)
GLOBULIN SER CALC-MCNC: 4.8 G/DL (ref 2.2–3.9)
GLUCOSE SERPL-MCNC: 93 MG/DL (ref 74–106)
HCT VFR BLD AUTO: 28 % (ref 39–51)
HGB BLD-MCNC: 8.7 G/DL (ref 13.5–17.5)
LYMPHOCYTES NFR BLD AUTO: 0.4 /CMM (ref 0.8–4.8)
LYMPHOCYTES NFR BLD AUTO: 1.6 % (ref 20–44)
MCHC RBC AUTO-ENTMCNC: 31 G/DL (ref 31–36)
MCV RBC AUTO: 76 FL (ref 80–96)
MONOCYTES NFR BLD AUTO: 0.5 /CMM (ref 0.1–1.3)
MONOCYTES NFR BLD AUTO: 1.9 % (ref 2–12)
NEUTROPHILS # BLD AUTO: 23.6 /CMM (ref 1.8–8.9)
NEUTROPHILS NFR BLD AUTO: 96.4 % (ref 43–81)
PLATELET # BLD AUTO: 769 /CMM (ref 150–450)
POTASSIUM SERPL-SCNC: 4.6 MMOL/L (ref 3.5–5.1)
PTH-INTACT SERPL-MCNC: 78 PG/ML (ref 15–65)
RBC # BLD AUTO: 3.68 MIL/UL (ref 4.5–6)
SODIUM SERPL-SCNC: 142 MMOL/L (ref 136–145)
WBC NRBC COR # BLD AUTO: 24.5 K/UL (ref 4.3–11)

## 2020-12-22 RX ADMIN — CEFEPIME HYDROCHLORIDE SCH MLS/HR: 1 INJECTION, POWDER, FOR SOLUTION INTRAMUSCULAR; INTRAVENOUS at 17:31

## 2020-12-22 RX ADMIN — HEPARIN SODIUM SCH UNITS: 5000 INJECTION INTRAVENOUS; SUBCUTANEOUS at 10:52

## 2020-12-22 RX ADMIN — ZOLPIDEM TARTRATE PRN MG: 5 TABLET, FILM COATED ORAL at 22:34

## 2020-12-22 RX ADMIN — HEPARIN SODIUM SCH UNITS: 5000 INJECTION INTRAVENOUS; SUBCUTANEOUS at 21:09

## 2020-12-22 RX ADMIN — GABAPENTIN SCH MG: 300 CAPSULE ORAL at 13:10

## 2020-12-22 RX ADMIN — ZOLPIDEM TARTRATE PRN MG: 5 TABLET, FILM COATED ORAL at 01:41

## 2020-12-22 RX ADMIN — DEXTROSE MONOHYDRATE SCH MLS/HR: 50 INJECTION, SOLUTION INTRAVENOUS at 03:18

## 2020-12-22 RX ADMIN — GABAPENTIN SCH MG: 300 CAPSULE ORAL at 17:31

## 2020-12-22 RX ADMIN — DEXAMETHASONE SODIUM PHOSPHATE SCH MG: 10 INJECTION INTRAMUSCULAR; INTRAVENOUS at 10:51

## 2020-12-22 RX ADMIN — GABAPENTIN SCH MG: 300 CAPSULE ORAL at 10:50

## 2020-12-22 RX ADMIN — SODIUM CHLORIDE PRN MLS/HR: 9 INJECTION, SOLUTION INTRAVENOUS at 18:47

## 2020-12-22 RX ADMIN — FERROUS SULFATE TAB 325 MG (65 MG ELEMENTAL FE) SCH MG: 325 (65 FE) TAB at 10:50

## 2020-12-22 RX ADMIN — DEXTROSE MONOHYDRATE SCH MLS/HR: 50 INJECTION, SOLUTION INTRAVENOUS at 15:31

## 2020-12-22 RX ADMIN — QUETIAPINE SCH MG: 25 TABLET, FILM COATED ORAL at 21:10

## 2020-12-22 RX ADMIN — VALSARTAN SCH MG: 80 TABLET ORAL at 21:09

## 2020-12-22 RX ADMIN — Medication SCH MG: at 10:50

## 2020-12-22 RX ADMIN — Medication PRN TAB: at 21:10

## 2020-12-22 RX ADMIN — FERROUS SULFATE TAB 325 MG (65 MG ELEMENTAL FE) SCH MG: 325 (65 FE) TAB at 17:31

## 2020-12-22 RX ADMIN — AMLODIPINE BESYLATE SCH MG: 10 TABLET ORAL at 10:51

## 2020-12-22 NOTE — NUR
TELE RN NOTES



PATIENT COMPLAINING OF PAIN 6/10. GIVEN NORCO PRN AT 2110. VITAL SIGNS WNL. WILL CONTINUE TO 
MONITOR.

## 2020-12-22 NOTE — NUR
ms rn

received on bed, awake,alert,oriented x4,patient on high flow oxygen,no distress noted, 
denies pain at this time, colostomy intact w/ dark green colored stool. all needs attended.

## 2020-12-22 NOTE — NUR
TELE RN NOTES



PATIENT IN BED, AWAKE, ALERT AND ORIENTED X 4. BREATHING EVEN AND UNLABORED ON 40LPM HIGH 
FLOW. SHOWS NO SIGNS OF ACUTE RESPIRATORY DISTRESS, NO ACUTE PAIN. TELE ST. PT WITH 
COLOSTOMY BAG INTACT AND IN PLACE. SKIN WNL. IV ON RAC 18G RUNNING NS AT 50ML/HR AND L FA 
20G ITS CLEAN DRY AND INTACT. SHOWS NO SIGNS OF INFILTRATION, NO REDNESS. SAFETY PRECAUTIONS 
IN PLACE. BED IN LOWEST POSITION, LOCKED, AND CALL LIGHT KEPT WITHIN REACH. WILL CONTINUE TO 
MONITOR.

## 2020-12-22 NOTE — NUR
TELE/RN CLOSING NOTES:



PATIENT REMAINS ON BED, A/O X4. DENIES PAIN AT THIS TIME. VERBALLY RESPONSIVE. IN NO 
APPARENT RESPIRATORY DISTRESS NOTED. PATIENT IS ON HIGH FLOW OXYGEN at 40L FIO2 %, 
SATURATING AT 99%. TELE MONITOR READING SINUS RHYTHM 96 BPM. NO C/O PAIN AT THIS TIME. 
SAFETY PRECAUTIONS WAS IN PLACED. BED IN LOWEST AND LOCKED POSITION. SIDE RAILS UP X2. CALL 
LIGHT WITHIN REACH. ALL DUE MEDS GIVEN AS ORDERED. ALL NURSING NEEDS MET AND RENDERED. WILL 
ENDORSE TO DAY SHIFT FOR CAROLA.

## 2020-12-23 VITALS — SYSTOLIC BLOOD PRESSURE: 140 MMHG | DIASTOLIC BLOOD PRESSURE: 82 MMHG

## 2020-12-23 VITALS — DIASTOLIC BLOOD PRESSURE: 79 MMHG | SYSTOLIC BLOOD PRESSURE: 138 MMHG

## 2020-12-23 VITALS — SYSTOLIC BLOOD PRESSURE: 130 MMHG | DIASTOLIC BLOOD PRESSURE: 82 MMHG

## 2020-12-23 VITALS — SYSTOLIC BLOOD PRESSURE: 128 MMHG | DIASTOLIC BLOOD PRESSURE: 70 MMHG

## 2020-12-23 VITALS — DIASTOLIC BLOOD PRESSURE: 73 MMHG | SYSTOLIC BLOOD PRESSURE: 128 MMHG

## 2020-12-23 LAB
BASOPHILS # BLD AUTO: 0 /CMM (ref 0–0.2)
BASOPHILS NFR BLD AUTO: 0.1 % (ref 0–2)
BUN SERPL-MCNC: 59 MG/DL (ref 7–18)
CALCIUM SERPL-MCNC: 9.2 MG/DL (ref 8.5–10.1)
CHLORIDE SERPL-SCNC: 107 MMOL/L (ref 98–107)
CO2 SERPL-SCNC: 19 MMOL/L (ref 21–32)
CREAT SERPL-MCNC: 1.7 MG/DL (ref 0.6–1.3)
EOSINOPHIL NFR BLD AUTO: 0.1 % (ref 0–6)
GLUCOSE SERPL-MCNC: 90 MG/DL (ref 74–106)
HCT VFR BLD AUTO: 31 % (ref 39–51)
HCT VFR BLD AUTO: 32 % (ref 39–51)
HGB BLD-MCNC: 9.8 G/DL (ref 13.5–17.5)
HGB BLD-MCNC: 9.8 G/DL (ref 13.5–17.5)
LYMPHOCYTES NFR BLD AUTO: 0.6 /CMM (ref 0.8–4.8)
LYMPHOCYTES NFR BLD AUTO: 2.5 % (ref 20–44)
MAGNESIUM SERPL-MCNC: 1.5 MG/DL (ref 1.8–2.4)
MCHC RBC AUTO-ENTMCNC: 31 G/DL (ref 31–36)
MCHC RBC AUTO-ENTMCNC: 32 G/DL (ref 31–36)
MCV RBC AUTO: 76 FL (ref 80–96)
MCV RBC AUTO: 76 FL (ref 80–96)
MONOCYTES NFR BLD AUTO: 0.4 /CMM (ref 0.1–1.3)
MONOCYTES NFR BLD AUTO: 1.9 % (ref 2–12)
NEUTROPHILS # BLD AUTO: 23 /CMM (ref 1.8–8.9)
NEUTROPHILS NFR BLD AUTO: 95.4 % (ref 43–81)
PHOSPHATE SERPL-MCNC: 2.7 MG/DL (ref 2.5–4.9)
PLATELET # BLD AUTO: 782 /CMM (ref 150–450)
PLATELET # BLD AUTO: 808 /CMM (ref 150–450)
POTASSIUM SERPL-SCNC: 4.4 MMOL/L (ref 3.5–5.1)
RBC # BLD AUTO: 4.1 MIL/UL (ref 4.5–6)
RBC # BLD AUTO: 4.18 MIL/UL (ref 4.5–6)
SODIUM SERPL-SCNC: 140 MMOL/L (ref 136–145)
WBC NRBC COR # BLD AUTO: 24.1 K/UL (ref 4.3–11)
WBC NRBC COR # BLD AUTO: 24.1 K/UL (ref 4.3–11)

## 2020-12-23 RX ADMIN — VALSARTAN SCH MG: 80 TABLET ORAL at 21:23

## 2020-12-23 RX ADMIN — CEFEPIME HYDROCHLORIDE SCH MLS/HR: 1 INJECTION, POWDER, FOR SOLUTION INTRAMUSCULAR; INTRAVENOUS at 16:45

## 2020-12-23 RX ADMIN — AMLODIPINE BESYLATE SCH MG: 10 TABLET ORAL at 09:40

## 2020-12-23 RX ADMIN — Medication PRN TAB: at 14:22

## 2020-12-23 RX ADMIN — HEPARIN SODIUM SCH UNITS: 5000 INJECTION INTRAVENOUS; SUBCUTANEOUS at 09:43

## 2020-12-23 RX ADMIN — FERROUS SULFATE TAB 325 MG (65 MG ELEMENTAL FE) SCH MG: 325 (65 FE) TAB at 16:45

## 2020-12-23 RX ADMIN — HEPARIN SODIUM SCH UNITS: 5000 INJECTION INTRAVENOUS; SUBCUTANEOUS at 21:22

## 2020-12-23 RX ADMIN — Medication SCH MG: at 09:40

## 2020-12-23 RX ADMIN — QUETIAPINE SCH MG: 25 TABLET, FILM COATED ORAL at 21:23

## 2020-12-23 RX ADMIN — Medication PRN TAB: at 18:17

## 2020-12-23 RX ADMIN — Medication PRN TAB: at 10:37

## 2020-12-23 RX ADMIN — DEXAMETHASONE SODIUM PHOSPHATE SCH MG: 10 INJECTION INTRAMUSCULAR; INTRAVENOUS at 09:41

## 2020-12-23 RX ADMIN — DEXTROSE MONOHYDRATE SCH MLS/HR: 50 INJECTION, SOLUTION INTRAVENOUS at 14:08

## 2020-12-23 RX ADMIN — DEXTROSE MONOHYDRATE SCH MLS/HR: 50 INJECTION, SOLUTION INTRAVENOUS at 03:07

## 2020-12-23 RX ADMIN — GABAPENTIN SCH MG: 300 CAPSULE ORAL at 09:41

## 2020-12-23 RX ADMIN — GABAPENTIN SCH MG: 300 CAPSULE ORAL at 16:45

## 2020-12-23 RX ADMIN — FERROUS SULFATE TAB 325 MG (65 MG ELEMENTAL FE) SCH MG: 325 (65 FE) TAB at 09:41

## 2020-12-23 RX ADMIN — GABAPENTIN SCH MG: 300 CAPSULE ORAL at 13:51

## 2020-12-23 NOTE — NUR
CHANGE OF SHIFT REPORT



PT RESTING COMFORTABLY IN BED. NO S/S OR C/O PAIN OR DISTRESS NOTED. SIDE RAILS UP X2, CALL 
LIGHT LEFT WITHIN REACH. PT COMPLIANT WITH MEDICATIONS. PT CALM AND COOPERATIVE. PT KEPT 
CLEAN, DRY, AND COMFORTABLE. IV SITE IS PATENT AND FLUIDS RUN THOROUGHLY. NO SIGNIFICANT 
CHANGES SINCE PREVIOUS SHIFT. WILL GIVE REPORT TO NOC RN.

## 2020-12-23 NOTE — NUR
TELE RN NOTES

RECEIVED ON BED A.O X4,ABLE TO VERBALIZED NEEDS,BREATHING REGULAR,NOT I ANY FORM OF 
DISTRESS.DROPLET ISOLATION FOR COVID 19 POSITIVE,PRESENT IVF NS AT 50ML/HR RATE INFUSING 
WELL ON RIGHT AC VIA IV PUMP,SITE PATENT.DISCUSS PLAN FOR TONIGHT AND HE FEEL BETTER ABOUT 
IT.CALL LIGHT IN REACH,NEEDS ANTICIPATED.

## 2020-12-23 NOTE — NUR
TELE RN AM NOTES



PATIENT IN BED, AWAKE, ALERT AND ORIENTED X 4. BREATHING EVEN AND UNLABORED ON 20LPM HIGH 
FLOW. O2 SAT 94% SHOWS NO SIGNS OF ACUTE RESPIRATORY DISTRESS, NO ACUTE PAIN. TELE ST. PT 
WITH COLOSTOMY BAG INTACT AND IN PLACE. SKIN WNL. IV ON RAC 18G RUNNING NS AT 50ML/HR AND L 
FA 20G ITS CLEAN DRY AND INTACT. SHOWS NO SIGNS OF INFILTRATION, NO REDNESS.  ALL DUE 
MEDICATIONS.ISOLATION PRECAUTIONS IN PLACE SAFETY PRECAUTIONS IN PLACE. BED IN LOWEST 
POSITION, LOCKED, AND CALL LIGHT KEPT WITHIN REACH.

## 2020-12-23 NOTE — NUR
TELE RN NOTES



PATIENT IN BED, ASLEEP, ALERT AND ORIENTED X 4. BREATHING EVEN AND UNLABORED ON 20LPM HIGH 
FLOW. SHOWS NO SIGNS OF ACUTE RESPIRATORY DISTRESS, NO ACUTE PAIN. TELE ST. PT WITH 
COLOSTOMY BAG INTACT AND IN PLACE. SKIN WNL. IV ON RAC 18G RUNNING NS AT 50ML/HR AND L FA 
20G ITS CLEAN DRY AND INTACT. SHOWS NO SIGNS OF INFILTRATION, NO REDNESS.  ALL DUE 
MEDICATIONS.ISOLATION PRECAUTIONS IN PLACE SAFETY PRECAUTIONS IN PLACE. BED IN LOWEST 
POSITION, LOCKED, AND CALL LIGHT KEPT WITHIN REACH. WILL ENDORSE TO ONCOMING NURSE

## 2020-12-24 VITALS — DIASTOLIC BLOOD PRESSURE: 70 MMHG | SYSTOLIC BLOOD PRESSURE: 120 MMHG

## 2020-12-24 VITALS — SYSTOLIC BLOOD PRESSURE: 119 MMHG | DIASTOLIC BLOOD PRESSURE: 74 MMHG

## 2020-12-24 VITALS — SYSTOLIC BLOOD PRESSURE: 115 MMHG | DIASTOLIC BLOOD PRESSURE: 65 MMHG

## 2020-12-24 VITALS — DIASTOLIC BLOOD PRESSURE: 74 MMHG | SYSTOLIC BLOOD PRESSURE: 125 MMHG

## 2020-12-24 VITALS — SYSTOLIC BLOOD PRESSURE: 130 MMHG | DIASTOLIC BLOOD PRESSURE: 78 MMHG

## 2020-12-24 VITALS — DIASTOLIC BLOOD PRESSURE: 69 MMHG | SYSTOLIC BLOOD PRESSURE: 126 MMHG

## 2020-12-24 VITALS — SYSTOLIC BLOOD PRESSURE: 123 MMHG | DIASTOLIC BLOOD PRESSURE: 74 MMHG

## 2020-12-24 VITALS — SYSTOLIC BLOOD PRESSURE: 124 MMHG | DIASTOLIC BLOOD PRESSURE: 71 MMHG

## 2020-12-24 VITALS — SYSTOLIC BLOOD PRESSURE: 133 MMHG | DIASTOLIC BLOOD PRESSURE: 72 MMHG

## 2020-12-24 VITALS — DIASTOLIC BLOOD PRESSURE: 75 MMHG | SYSTOLIC BLOOD PRESSURE: 132 MMHG

## 2020-12-24 LAB
BASOPHILS # BLD AUTO: 0 /CMM (ref 0–0.2)
BASOPHILS NFR BLD AUTO: 0.1 % (ref 0–2)
BUN SERPL-MCNC: 61 MG/DL (ref 7–18)
CALCIUM SERPL-MCNC: 9.8 MG/DL (ref 8.5–10.1)
CHLORIDE SERPL-SCNC: 105 MMOL/L (ref 98–107)
CO2 SERPL-SCNC: 19 MMOL/L (ref 21–32)
CREAT SERPL-MCNC: 1.7 MG/DL (ref 0.6–1.3)
EOSINOPHIL NFR BLD AUTO: 0 % (ref 0–6)
GLUCOSE SERPL-MCNC: 90 MG/DL (ref 74–106)
HCT VFR BLD AUTO: 33 % (ref 39–51)
HGB BLD-MCNC: 10.3 G/DL (ref 13.5–17.5)
LYMPHOCYTES NFR BLD AUTO: 0.5 /CMM (ref 0.8–4.8)
LYMPHOCYTES NFR BLD AUTO: 2 % (ref 20–44)
MAGNESIUM SERPL-MCNC: 2.2 MG/DL (ref 1.8–2.4)
MCHC RBC AUTO-ENTMCNC: 31 G/DL (ref 31–36)
MCV RBC AUTO: 76 FL (ref 80–96)
MONOCYTES NFR BLD AUTO: 0.6 /CMM (ref 0.1–1.3)
MONOCYTES NFR BLD AUTO: 2.1 % (ref 2–12)
NEUTROPHILS # BLD AUTO: 25.1 /CMM (ref 1.8–8.9)
NEUTROPHILS NFR BLD AUTO: 95.8 % (ref 43–81)
PHOSPHATE SERPL-MCNC: 4.5 MG/DL (ref 2.5–4.9)
PLATELET # BLD AUTO: 746 /CMM (ref 150–450)
POTASSIUM SERPL-SCNC: 5.7 MMOL/L (ref 3.5–5.1)
RBC # BLD AUTO: 4.34 MIL/UL (ref 4.5–6)
SODIUM SERPL-SCNC: 137 MMOL/L (ref 136–145)
WBC NRBC COR # BLD AUTO: 26.2 K/UL (ref 4.3–11)

## 2020-12-24 PROCEDURE — XW13325 TRANSFUSION OF CONVALESCENT PLASMA (NONAUTOLOGOUS) INTO PERIPHERAL VEIN, PERCUTANEOUS APPROACH, NEW TECHNOLOGY GROUP 5: ICD-10-PCS | Performed by: SPECIALIST

## 2020-12-24 RX ADMIN — Medication SCH MG: at 09:04

## 2020-12-24 RX ADMIN — HEPARIN SODIUM SCH UNITS: 5000 INJECTION INTRAVENOUS; SUBCUTANEOUS at 21:53

## 2020-12-24 RX ADMIN — Medication PRN TAB: at 19:53

## 2020-12-24 RX ADMIN — DEXAMETHASONE SODIUM PHOSPHATE SCH MG: 10 INJECTION INTRAMUSCULAR; INTRAVENOUS at 09:03

## 2020-12-24 RX ADMIN — Medication PRN TAB: at 09:05

## 2020-12-24 RX ADMIN — GABAPENTIN SCH MG: 300 CAPSULE ORAL at 16:51

## 2020-12-24 RX ADMIN — DEXTROSE MONOHYDRATE SCH MLS/HR: 50 INJECTION, SOLUTION INTRAVENOUS at 15:36

## 2020-12-24 RX ADMIN — CEFEPIME HYDROCHLORIDE SCH MLS/HR: 1 INJECTION, POWDER, FOR SOLUTION INTRAMUSCULAR; INTRAVENOUS at 16:51

## 2020-12-24 RX ADMIN — SODIUM CHLORIDE PRN MLS/HR: 9 INJECTION, SOLUTION INTRAVENOUS at 16:54

## 2020-12-24 RX ADMIN — AMLODIPINE BESYLATE SCH MG: 10 TABLET ORAL at 09:06

## 2020-12-24 RX ADMIN — VALSARTAN SCH MG: 80 TABLET ORAL at 21:53

## 2020-12-24 RX ADMIN — HEPARIN SODIUM SCH UNITS: 5000 INJECTION INTRAVENOUS; SUBCUTANEOUS at 09:16

## 2020-12-24 RX ADMIN — FERROUS SULFATE TAB 325 MG (65 MG ELEMENTAL FE) SCH MG: 325 (65 FE) TAB at 09:05

## 2020-12-24 RX ADMIN — QUETIAPINE SCH MG: 25 TABLET, FILM COATED ORAL at 21:53

## 2020-12-24 RX ADMIN — GABAPENTIN SCH MG: 300 CAPSULE ORAL at 12:37

## 2020-12-24 RX ADMIN — GABAPENTIN SCH MG: 300 CAPSULE ORAL at 09:04

## 2020-12-24 RX ADMIN — FERROUS SULFATE TAB 325 MG (65 MG ELEMENTAL FE) SCH MG: 325 (65 FE) TAB at 16:51

## 2020-12-24 NOTE — NUR
TELE RN NOTES



STARTED PATIENT ON CONV. PLASMA, WILL CONTINUE TO MONITOR PATIENT'S VITAL SIGNS, AND ASSESS 
FOR ANY REACTIONS AT THIS TIME.

## 2020-12-24 NOTE — NUR
TELE RN NOTES



PATIENT RECEIVED IN BED, ALERT AND ORIENTED, ON NASAL CANNULA, WITH NO SIGNS OF RESPIRATORY 
DISTRESS AT THIS TIME. ON CARDIAC MONITOR, SINUS RHYTHM 100'S. PATIENT SKIN WARM AND DRY TO 
TOUCH, IV ACCESS INTACT AND PATENT. OSTOMY BAG INTACT AND PATENT NO LEAKAGE PRESENT. PATIENT 
PRESENTING WITH NO PAIN OR DISCOMFORT AT THIS TIME. SAFETY PRECAUTIONS IMPLEMENTED WITH BED 
LOCKED, BILATERAL SIDE RAILS UP, BED ALARM ON, BED IN THE LOWEST POSITION AND CALL LIGHT 
WITHIN EASY REACH. WILL CONTINUE TO MONITOR.

## 2020-12-24 NOTE — NUR
TELE RN NOTES

 FAIRLY RESTED AT NIGHT,NO SOB,AFEBRILE,O2 SAT 97% ON 5L/NC.AWAITING CONVALESCENT PLASMA 
TRANSFUSION,NO DELIVERY YET PER BLOOD BACK.COLOSTOMY BAG CHANGED.IN NO ACUTE 
DISTRESS.ENDORSE TO DAY NURSE FOR CAROLA.

## 2020-12-24 NOTE — NUR
TELE RN NOTES



PATIENT IN BED, ALERT AND ORIENTED, ON NASAL CANNULA, WITH NO SIGNS OF RESPIRATORY DISTRESS 
AT THIS TIME. ON CARDIAC MONITOR, SINUS RHYTHM/ SINUS TACH 100'S. PATIENT SKIN KEPT CLEAN, 
WARM AND DRY TO TOUCH, IV ACCESS INTACT AND PATENT. OSTOMY BAG INTACT AND PATENT NO LEAKAGE 
PRESENT. PATIENT PRESENTING WITH NO PAIN OR DISCOMFORT AT THIS TIME. MET ALL OF PATIENT'S 
NEEDS. SAFETY PRECAUTIONS IMPLEMENTED WITH BED LOCKED, BILATERAL SIDE RAILS UP, BED ALARM 
ON, BED IN THE LOWEST POSITION AND CALL LIGHT WITHIN EASY REACH. WILL ENDORSE PLAN OF CARE 
TO UPCOMING RN.

## 2020-12-24 NOTE — NUR
TELE RN NOTES

AWAKE,THIS TIME,FEELING HUNGRY,GIVEN ORANGE JUICE AND PUDDING PER PATIENT REQUEST.NEGATIVE 
FOR ASPIRATION

## 2020-12-24 NOTE — NUR
TELE RN NOTES  PAIN MANAGEMENT

C/O BACK PAIN 6/10 ON PAIN SCALE,NORCO 5/325MG,1 TAB PO CRUSHED AND GIVEN WITH APPLE SAUCE.

## 2020-12-24 NOTE — NUR
TELE RN NOTES

RECEIVED RESTING COMFORTABLY ON BED,WITH FACIAL GRIMACE NOTED,C/O BACK PAIN 6/10 ON PAIN 
SCALE,WILL MEDICATE.IVF NS AT 50ML/HR RATE INFUSING WELL VIA IV PUMP ON RAC SALINE LOCK.WITH 
RIGHT ABDOMEN COLOSTOMY BAG DRAINING LIQUID OUTPUT,PATIENT GOT KAYEXALATE ON DAY 
SHIFT.ISOLATION FOR COVID 19 POSITIVE,O2 IN USED AT 6L/NC TO KEEP O2 SAT ABOVE 90%.WILL 
CONTINUE TO MONITOR STATUS.

## 2020-12-25 VITALS — SYSTOLIC BLOOD PRESSURE: 117 MMHG | DIASTOLIC BLOOD PRESSURE: 66 MMHG

## 2020-12-25 VITALS — DIASTOLIC BLOOD PRESSURE: 71 MMHG | SYSTOLIC BLOOD PRESSURE: 112 MMHG

## 2020-12-25 VITALS — SYSTOLIC BLOOD PRESSURE: 112 MMHG | DIASTOLIC BLOOD PRESSURE: 53 MMHG

## 2020-12-25 VITALS — SYSTOLIC BLOOD PRESSURE: 120 MMHG | DIASTOLIC BLOOD PRESSURE: 71 MMHG

## 2020-12-25 VITALS — SYSTOLIC BLOOD PRESSURE: 108 MMHG | DIASTOLIC BLOOD PRESSURE: 67 MMHG

## 2020-12-25 LAB
BUN SERPL-MCNC: 67 MG/DL (ref 7–18)
CALCIUM SERPL-MCNC: 9.6 MG/DL (ref 8.5–10.1)
CHLORIDE SERPL-SCNC: 107 MMOL/L (ref 98–107)
CO2 SERPL-SCNC: 20 MMOL/L (ref 21–32)
CREAT SERPL-MCNC: 1.8 MG/DL (ref 0.6–1.3)
GLUCOSE SERPL-MCNC: 118 MG/DL (ref 74–106)
POTASSIUM SERPL-SCNC: 5.4 MMOL/L (ref 3.5–5.1)
SODIUM SERPL-SCNC: 140 MMOL/L (ref 136–145)

## 2020-12-25 RX ADMIN — HEPARIN SODIUM SCH UNITS: 5000 INJECTION INTRAVENOUS; SUBCUTANEOUS at 20:54

## 2020-12-25 RX ADMIN — HEPARIN SODIUM SCH UNITS: 5000 INJECTION INTRAVENOUS; SUBCUTANEOUS at 09:12

## 2020-12-25 RX ADMIN — Medication SCH MG: at 09:03

## 2020-12-25 RX ADMIN — FERROUS SULFATE TAB 325 MG (65 MG ELEMENTAL FE) SCH MG: 325 (65 FE) TAB at 09:10

## 2020-12-25 RX ADMIN — Medication PRN TAB: at 09:10

## 2020-12-25 RX ADMIN — SODIUM CHLORIDE PRN MLS/HR: 9 INJECTION, SOLUTION INTRAVENOUS at 12:06

## 2020-12-25 RX ADMIN — Medication PRN TAB: at 21:01

## 2020-12-25 RX ADMIN — FERROUS SULFATE TAB 325 MG (65 MG ELEMENTAL FE) SCH MG: 325 (65 FE) TAB at 16:08

## 2020-12-25 RX ADMIN — GABAPENTIN SCH MG: 300 CAPSULE ORAL at 16:08

## 2020-12-25 RX ADMIN — VALSARTAN SCH MG: 80 TABLET ORAL at 21:00

## 2020-12-25 RX ADMIN — Medication PRN TAB: at 16:09

## 2020-12-25 RX ADMIN — QUETIAPINE SCH MG: 25 TABLET, FILM COATED ORAL at 21:00

## 2020-12-25 RX ADMIN — DEXAMETHASONE SODIUM PHOSPHATE SCH MG: 10 INJECTION INTRAMUSCULAR; INTRAVENOUS at 09:11

## 2020-12-25 RX ADMIN — AMLODIPINE BESYLATE SCH MG: 10 TABLET ORAL at 09:10

## 2020-12-25 RX ADMIN — GABAPENTIN SCH MG: 300 CAPSULE ORAL at 12:06

## 2020-12-25 RX ADMIN — ZOLPIDEM TARTRATE PRN MG: 5 TABLET, FILM COATED ORAL at 22:02

## 2020-12-25 RX ADMIN — GABAPENTIN SCH MG: 300 CAPSULE ORAL at 09:03

## 2020-12-25 NOTE — NUR
TELE/RN OPENING NOTES 



RECEIVED PATIENT IN BED RESTING. PATIENT IS ALERT AND ORIENTED X 3. PATIENT BREATHING IS 
EVEN AND UNLABORED. NO SIGNS OF SOB OR RESPIRATORY DISTRESS NOTED. PATIENT IN NO SIGNS OF 
DISTRESS. IV ACCESS ON LEFT FA #20G ANF RIGHT AC #18G. SAFETY MEASURES ARE IN PLACE, BED IS 
LOCKED AND PLACED IN THE LOW POSITION, SIDE RAILS UP X 2, CALL LIGHT WITH IN REACH. WILL 
CONTINUE TO MONITOR THROUGH OUT SHIFT.

## 2020-12-25 NOTE — NUR
RN Opening note



Received patient in bed, AO x 4 able to responds all stimuli, Pt does no appears pain or 
distress. Skin is warm to touch keep clean/dry intact IV site, respiratory even and 
unlabored with oxygen at 6LPM O2sat 96%. Kept locked bed with elevated HOB for aspiration 
precaution and ensure airway and lowest bed foe safety. Call light within reach, will 
continue to monitor.

## 2020-12-25 NOTE — NUR
RN Closing note



Patient in bed resting, does no appears pain or discomfort. Respiratory even and unlabored 
with oxygen at 4LPM and O2sat 97%. Skin is warm to touch keep clean/dry, intact IV site. 
Kept locked bed with elevated HOB for ensure airway and aspiration precaution and lowest bed 
for safety. Call, light within reach will endorse night shift.

## 2020-12-25 NOTE — NUR
TELE/RN NOTES 



PATIENT COMPLAINING OF PAIN, BACKSIDE. PATIENT GIVEN NORCO 5-325 MG PO. V/S ARE STABLE.

## 2020-12-25 NOTE — NUR
TELE/RN NOTES 



PATIENT REQUESTING FOR SLEEPING AID. PATIENT GIVEN AMBIEN 5 MG PO. PATIENT IN STABLE 
CONDITION.

## 2020-12-25 NOTE — NUR
MS RN NOTES

COMPLAINTS OF TOO MUCH COLD ION THE ROOM,ENGINEERING WAS CALLED TO FIX PATIENT CONCERN,IN NO 
ACUTE DISTRESS.COLOSTOMY BAG IN PLACE.IN NO ACUTE DISTRESS.DAY NURSE INFORMED TO FOLLOW UP 
WITH ENGINEERING TO REDUCE TEMPERATURE IN THE ROOM PER PATIENT REQUEST.

## 2020-12-26 VITALS — DIASTOLIC BLOOD PRESSURE: 66 MMHG | SYSTOLIC BLOOD PRESSURE: 116 MMHG

## 2020-12-26 VITALS — DIASTOLIC BLOOD PRESSURE: 69 MMHG | SYSTOLIC BLOOD PRESSURE: 125 MMHG

## 2020-12-26 VITALS — SYSTOLIC BLOOD PRESSURE: 126 MMHG | DIASTOLIC BLOOD PRESSURE: 67 MMHG

## 2020-12-26 VITALS — SYSTOLIC BLOOD PRESSURE: 110 MMHG | DIASTOLIC BLOOD PRESSURE: 56 MMHG

## 2020-12-26 VITALS — SYSTOLIC BLOOD PRESSURE: 125 MMHG | DIASTOLIC BLOOD PRESSURE: 79 MMHG

## 2020-12-26 VITALS — SYSTOLIC BLOOD PRESSURE: 129 MMHG | DIASTOLIC BLOOD PRESSURE: 64 MMHG

## 2020-12-26 LAB
BASOPHILS # BLD AUTO: 0 /CMM (ref 0–0.2)
BASOPHILS NFR BLD AUTO: 0 % (ref 0–2)
BUN SERPL-MCNC: 77 MG/DL (ref 7–18)
BUN SERPL-MCNC: 80 MG/DL (ref 7–18)
CALCIUM SERPL-MCNC: 9.3 MG/DL (ref 8.5–10.1)
CALCIUM SERPL-MCNC: 9.5 MG/DL (ref 8.5–10.1)
CHLORIDE SERPL-SCNC: 108 MMOL/L (ref 98–107)
CHLORIDE SERPL-SCNC: 109 MMOL/L (ref 98–107)
CO2 SERPL-SCNC: 16 MMOL/L (ref 21–32)
CO2 SERPL-SCNC: 19 MMOL/L (ref 21–32)
CREAT SERPL-MCNC: 1.9 MG/DL (ref 0.6–1.3)
CREAT SERPL-MCNC: 2 MG/DL (ref 0.6–1.3)
EOSINOPHIL NFR BLD AUTO: 0.4 % (ref 0–6)
GLUCOSE SERPL-MCNC: 117 MG/DL (ref 74–106)
GLUCOSE SERPL-MCNC: 70 MG/DL (ref 74–106)
HCT VFR BLD AUTO: 36 % (ref 39–51)
HGB BLD-MCNC: 10.4 G/DL (ref 13.5–17.5)
LYMPHOCYTES NFR BLD AUTO: 1 /CMM (ref 0.8–4.8)
LYMPHOCYTES NFR BLD AUTO: 4.8 % (ref 20–44)
MAGNESIUM SERPL-MCNC: 2.2 MG/DL (ref 1.8–2.4)
MCHC RBC AUTO-ENTMCNC: 29 G/DL (ref 31–36)
MCV RBC AUTO: 84 FL (ref 80–96)
MONOCYTES NFR BLD AUTO: 1 /CMM (ref 0.1–1.3)
MONOCYTES NFR BLD AUTO: 4.5 % (ref 2–12)
NEUTROPHILS # BLD AUTO: 19.8 /CMM (ref 1.8–8.9)
NEUTROPHILS NFR BLD AUTO: 90.3 % (ref 43–81)
PHOSPHATE SERPL-MCNC: 4.8 MG/DL (ref 2.5–4.9)
PLATELET # BLD AUTO: 751 /CMM (ref 150–450)
POTASSIUM SERPL-SCNC: 5.7 MMOL/L (ref 3.5–5.1)
POTASSIUM SERPL-SCNC: 6.3 MMOL/L (ref 3.5–5.1)
RBC # BLD AUTO: 4.36 MIL/UL (ref 4.5–6)
SODIUM SERPL-SCNC: 137 MMOL/L (ref 136–145)
SODIUM SERPL-SCNC: 138 MMOL/L (ref 136–145)
WBC NRBC COR # BLD AUTO: 21.9 K/UL (ref 4.3–11)

## 2020-12-26 RX ADMIN — SODIUM CHLORIDE PRN MLS/HR: 9 INJECTION, SOLUTION INTRAVENOUS at 13:51

## 2020-12-26 RX ADMIN — Medication PRN TAB: at 09:22

## 2020-12-26 RX ADMIN — Medication PRN TAB: at 15:03

## 2020-12-26 RX ADMIN — QUETIAPINE SCH MG: 25 TABLET, FILM COATED ORAL at 21:40

## 2020-12-26 RX ADMIN — FERROUS SULFATE TAB 325 MG (65 MG ELEMENTAL FE) SCH MG: 325 (65 FE) TAB at 09:06

## 2020-12-26 RX ADMIN — DEXAMETHASONE SODIUM PHOSPHATE SCH MG: 10 INJECTION INTRAMUSCULAR; INTRAVENOUS at 09:07

## 2020-12-26 RX ADMIN — HEPARIN SODIUM SCH UNITS: 5000 INJECTION INTRAVENOUS; SUBCUTANEOUS at 09:08

## 2020-12-26 RX ADMIN — VALSARTAN SCH MG: 80 TABLET ORAL at 21:40

## 2020-12-26 RX ADMIN — GABAPENTIN SCH MG: 300 CAPSULE ORAL at 09:07

## 2020-12-26 RX ADMIN — FERROUS SULFATE TAB 325 MG (65 MG ELEMENTAL FE) SCH MG: 325 (65 FE) TAB at 16:02

## 2020-12-26 RX ADMIN — GABAPENTIN SCH MG: 300 CAPSULE ORAL at 12:03

## 2020-12-26 RX ADMIN — GABAPENTIN SCH MG: 300 CAPSULE ORAL at 16:02

## 2020-12-26 RX ADMIN — Medication SCH MG: at 09:07

## 2020-12-26 RX ADMIN — Medication PRN TAB: at 21:48

## 2020-12-26 RX ADMIN — AMLODIPINE BESYLATE SCH MG: 10 TABLET ORAL at 09:06

## 2020-12-26 NOTE — NUR
TELE/RN CLOSING NOTES 



PATIENT IN BED SLEEPING EASY TO AROUSE. PATIENT IS ALERT AND ORIENTED X 3. PATIENT BREATHING 
IS EVEN AND UNLABORED. NO SIGNS OF SOB OR RESPIRATORY DISTRESS NOTED. PATIENT IN NO SIGNS OF 
DISTRESS. TELE READING IS NSR 100S. ALL NEEDS HAVE BEEN MET DURING SHIFT. PATIENT REFUSED 
MORNING CLEANING AND LINEN CHANGE.  IV ACCESS ON LEFT FA #20G AND RIGHT AC #18G. SAFETY 
MEASURES ARE IN PLACE, BED IS LOCKED AND PLACED IN THE LOW POSITION, SIDE RAILS UP X 2, CALL 
LIGHT WITH IN REACH. WILL ENDORSE CARE TO DAY SHIFT NURSE.

## 2020-12-26 NOTE — NUR
BUTCH/RN

DURING INITIAL SHIFT ROUND, PATIENT WAS IN BED AWAKE, ALERT, COMFORTABLE, NO DISTRESS NOTED, 
CALL LIGHT IN REACH. WILL MONITOR.

## 2020-12-26 NOTE — NUR
TELE/RN NOTE



Patient refused skin assessment at the back. Educated risks and benefits, patient states "My 
back will be in pain"

## 2020-12-26 NOTE — NUR
TELE/RN CLOSING NOTE



Patient awake in bed, A&O x 4. All needs met and attended to. Denies any pain/discomfort 
throughout shift. Breathing even and non-labored on 2L oxygen via NC. No respiratory or 
cardiac distress noted. On tele monitor, reading SR 90. LFA #20g and RAC #18 g IV accesses 
noted, patent and intact, and flushing well. Fall precautions maintained. Will endorse to 
night shift nurse.

## 2020-12-26 NOTE — NUR
TELE/RN NOTE



Lab called for patient's critical result of potassium: 6.8. Notified Bita NP, ordered 
repeat lab draw for potassium. Order carried out.

## 2020-12-26 NOTE — NUR
TELE/RN OPENING NOTE



Received patient resting in bed, A&O x 4, easily arousable to touch and verbal stimulation. 
Denies any pain/discomfort at this time. Breathing even and non-labored on 4L oxygen via NC. 
No respiratory or cardiac distress noted. On tele monitor, reading SR 90. LFA #20g and RAC 
#18 g IV accesses noted, patent and intact, and flushing well. Bed locked to its lowest 
position, side rails x 2 up, call light in hand. Will continue with current medical 
management.

## 2020-12-26 NOTE — NUR
TELE/RN NOTE



Patient's repeat potassium level noted at 5.7. Notified Bita NP, no new orders at this 
time.

## 2020-12-27 VITALS — DIASTOLIC BLOOD PRESSURE: 69 MMHG | SYSTOLIC BLOOD PRESSURE: 108 MMHG

## 2020-12-27 VITALS — DIASTOLIC BLOOD PRESSURE: 70 MMHG | SYSTOLIC BLOOD PRESSURE: 117 MMHG

## 2020-12-27 VITALS — DIASTOLIC BLOOD PRESSURE: 66 MMHG | SYSTOLIC BLOOD PRESSURE: 116 MMHG

## 2020-12-27 VITALS — SYSTOLIC BLOOD PRESSURE: 118 MMHG | DIASTOLIC BLOOD PRESSURE: 65 MMHG

## 2020-12-27 VITALS — DIASTOLIC BLOOD PRESSURE: 71 MMHG | SYSTOLIC BLOOD PRESSURE: 112 MMHG

## 2020-12-27 VITALS — SYSTOLIC BLOOD PRESSURE: 99 MMHG | DIASTOLIC BLOOD PRESSURE: 59 MMHG

## 2020-12-27 LAB
BASOPHILS # BLD AUTO: 0 /CMM (ref 0–0.2)
BASOPHILS NFR BLD AUTO: 0.1 % (ref 0–2)
BUN SERPL-MCNC: 76 MG/DL (ref 7–18)
BUN SERPL-MCNC: 78 MG/DL (ref 7–18)
CALCIUM SERPL-MCNC: 9.4 MG/DL (ref 8.5–10.1)
CALCIUM SERPL-MCNC: 9.7 MG/DL (ref 8.5–10.1)
CHLORIDE SERPL-SCNC: 110 MMOL/L (ref 98–107)
CHLORIDE SERPL-SCNC: 110 MMOL/L (ref 98–107)
CHLORIDE UR-SCNC: 103 MMOL/L (ref 55–125)
CO2 SERPL-SCNC: 17 MMOL/L (ref 21–32)
CO2 SERPL-SCNC: 19 MMOL/L (ref 21–32)
CREAT SERPL-MCNC: 1.8 MG/DL (ref 0.6–1.3)
CREAT SERPL-MCNC: 2.1 MG/DL (ref 0.6–1.3)
EOSINOPHIL NFR BLD AUTO: 0.3 % (ref 0–6)
GLUCOSE SERPL-MCNC: 134 MG/DL (ref 74–106)
GLUCOSE SERPL-MCNC: 92 MG/DL (ref 74–106)
HCT VFR BLD AUTO: 34 % (ref 39–51)
HGB BLD-MCNC: 10.3 G/DL (ref 13.5–17.5)
LYMPHOCYTES NFR BLD AUTO: 1.2 /CMM (ref 0.8–4.8)
LYMPHOCYTES NFR BLD AUTO: 8.1 % (ref 20–44)
MAGNESIUM SERPL-MCNC: 2.3 MG/DL (ref 1.8–2.4)
MCHC RBC AUTO-ENTMCNC: 30 G/DL (ref 31–36)
MCV RBC AUTO: 80 FL (ref 80–96)
MONOCYTES NFR BLD AUTO: 0.9 /CMM (ref 0.1–1.3)
MONOCYTES NFR BLD AUTO: 6.3 % (ref 2–12)
NEUTROPHILS # BLD AUTO: 12.8 /CMM (ref 1.8–8.9)
NEUTROPHILS NFR BLD AUTO: 85.2 % (ref 43–81)
OSMOLALITY UR: 353 MOS/KG (ref 340–1090)
PHOSPHATE SERPL-MCNC: 4.5 MG/DL (ref 2.5–4.9)
PLATELET # BLD AUTO: 756 /CMM (ref 150–450)
POTASSIUM SERPL-SCNC: 5.7 MMOL/L (ref 3.5–5.1)
POTASSIUM SERPL-SCNC: 6 MMOL/L (ref 3.5–5.1)
POTASSIUM UR-SCNC: 14 MMOL/L (ref 25–125)
RBC # BLD AUTO: 4.21 MIL/UL (ref 4.5–6)
SODIUM SERPL-SCNC: 139 MMOL/L (ref 136–145)
SODIUM SERPL-SCNC: 140 MMOL/L (ref 136–145)
SODIUM UR-SCNC: 93 MMOL/L (ref 40–220)
WBC NRBC COR # BLD AUTO: 15 K/UL (ref 4.3–11)

## 2020-12-27 RX ADMIN — GABAPENTIN SCH MG: 300 CAPSULE ORAL at 12:28

## 2020-12-27 RX ADMIN — Medication PRN TAB: at 10:05

## 2020-12-27 RX ADMIN — FERROUS SULFATE TAB 325 MG (65 MG ELEMENTAL FE) SCH MG: 325 (65 FE) TAB at 16:10

## 2020-12-27 RX ADMIN — QUETIAPINE SCH MG: 25 TABLET, FILM COATED ORAL at 21:52

## 2020-12-27 RX ADMIN — GABAPENTIN SCH MG: 300 CAPSULE ORAL at 10:05

## 2020-12-27 RX ADMIN — Medication PRN TAB: at 21:54

## 2020-12-27 RX ADMIN — Medication SCH MG: at 10:04

## 2020-12-27 RX ADMIN — DEXAMETHASONE SODIUM PHOSPHATE SCH MG: 10 INJECTION INTRAMUSCULAR; INTRAVENOUS at 10:04

## 2020-12-27 RX ADMIN — FERROUS SULFATE TAB 325 MG (65 MG ELEMENTAL FE) SCH MG: 325 (65 FE) TAB at 10:05

## 2020-12-27 RX ADMIN — AMLODIPINE BESYLATE SCH MG: 10 TABLET ORAL at 10:06

## 2020-12-27 RX ADMIN — GABAPENTIN SCH MG: 300 CAPSULE ORAL at 16:10

## 2020-12-27 RX ADMIN — Medication PRN TAB: at 14:59

## 2020-12-27 NOTE — NUR
TELE/RN OPENING NOTE



Patient awake in bed, verbally responsive. A/O x4. Breathing even and non-labored on 2L 
oxygen via NC. No respiratory or cardiac distress noted. On tele monitor, reading of SR, hr 
in the 80's. IV line one LFA #20g and RAC #18g, patent and intact. Colostomy on RLQ intact. 
Safety precs in place: bed locked and on lowest position, sr up x2, call light w/in reach. 
Will continue to monitor.

## 2020-12-27 NOTE — NUR
BUTCH/RN

PATIENT IS AWAKE, COMFORTABLE, NO DISTRESS NOTED, REFUSED MORNING CARE, ALL NEEDS ATTENDED 
AT THIS TIME, WILL CONTINUE TO MONITOR.

## 2020-12-27 NOTE — NUR
TELE RN CLOSING NOTES



Patient is in bed awake and verbally responsive. A/O x4, able to make needs known. Breathing 
even and non-labored, on 2L oxygen via NC. On tele monitor, reading of SR, hr in the 90's, 
no cardiac distress noted. IV line on LFA #20g and RAC #18g, patent and intact, ivf infusing 
well. Colostomy on RLQ intact, changed prn during shift. Safety precs maintained: bed locked 
and on lowest position, sr up x2, call light w/in reach. Will endorse to night shift rn for 
monique.

## 2020-12-27 NOTE — NUR
RN NOTES



RECEIVED PATIENT IN BED AWAKE ALERT AND ORIENTED X 4. ON O2 VIA NC AT 2. DENIES SOB. NO 
DISTRESS NOTED. COLOSTOMY BAG ON RIGHT ABDOMEN INTACT WITH BM. NO S/SX OF INFECTION. TELE 
MONITOR SHOWS SINUS RHYTHM. LFA AND RAC IV PATENT AND INTACT. IVF NS RUNNING AT 50 CC/HR. NI 
SIGNS OF INFILTRATION. ALL SAFETY MEASURES IMPLEMENTED PER PROTOCOL. BED LOCKED IN LOWEST 
POSITION. CALL LIGHT WITHIN REACH. SIDE RAILS UP X 2.

## 2020-12-28 VITALS — SYSTOLIC BLOOD PRESSURE: 116 MMHG | DIASTOLIC BLOOD PRESSURE: 63 MMHG

## 2020-12-28 VITALS — DIASTOLIC BLOOD PRESSURE: 69 MMHG | SYSTOLIC BLOOD PRESSURE: 122 MMHG

## 2020-12-28 VITALS — SYSTOLIC BLOOD PRESSURE: 106 MMHG | DIASTOLIC BLOOD PRESSURE: 67 MMHG

## 2020-12-28 VITALS — SYSTOLIC BLOOD PRESSURE: 130 MMHG | DIASTOLIC BLOOD PRESSURE: 67 MMHG

## 2020-12-28 VITALS — SYSTOLIC BLOOD PRESSURE: 131 MMHG | DIASTOLIC BLOOD PRESSURE: 75 MMHG

## 2020-12-28 VITALS — DIASTOLIC BLOOD PRESSURE: 66 MMHG | SYSTOLIC BLOOD PRESSURE: 113 MMHG

## 2020-12-28 LAB
BASOPHILS # BLD AUTO: 0 /CMM (ref 0–0.2)
BASOPHILS NFR BLD AUTO: 0.2 % (ref 0–2)
BUN SERPL-MCNC: 72 MG/DL (ref 7–18)
CALCIUM SERPL-MCNC: 9.2 MG/DL (ref 8.5–10.1)
CHLORIDE SERPL-SCNC: 110 MMOL/L (ref 98–107)
CO2 SERPL-SCNC: 16 MMOL/L (ref 21–32)
CREAT SERPL-MCNC: 1.8 MG/DL (ref 0.6–1.3)
EOSINOPHIL NFR BLD AUTO: 0.3 % (ref 0–6)
GLUCOSE SERPL-MCNC: 85 MG/DL (ref 74–106)
HCT VFR BLD AUTO: 34 % (ref 39–51)
HGB BLD-MCNC: 10.6 G/DL (ref 13.5–17.5)
LYMPHOCYTES NFR BLD AUTO: 1.3 /CMM (ref 0.8–4.8)
LYMPHOCYTES NFR BLD AUTO: 8.8 % (ref 20–44)
LYMPHOCYTES NFR BLD MANUAL: 10 % (ref 16–48)
MAGNESIUM SERPL-MCNC: 2.9 MG/DL (ref 1.8–2.4)
MCHC RBC AUTO-ENTMCNC: 31 G/DL (ref 31–36)
MCV RBC AUTO: 79 FL (ref 80–96)
METAMYELOCYTES NFR BLD MANUAL: 1 % (ref 0–0)
MONOCYTES NFR BLD AUTO: 1.2 /CMM (ref 0.1–1.3)
MONOCYTES NFR BLD AUTO: 7.7 % (ref 2–12)
MONOCYTES NFR BLD MANUAL: 6 % (ref 0–11)
MYELOCYTES NFR BLD MANUAL: 1 % (ref 0–0)
NEUTROPHILS # BLD AUTO: 12.7 /CMM (ref 1.8–8.9)
NEUTROPHILS NFR BLD AUTO: 83 % (ref 43–81)
NEUTS BAND NFR BLD MANUAL: 6 % (ref 0–5)
NEUTS SEG NFR BLD MANUAL: 76 % (ref 42–76)
PHOSPHATE SERPL-MCNC: 4.2 MG/DL (ref 2.5–4.9)
PLATELET # BLD AUTO: 831 /CMM (ref 150–450)
POTASSIUM SERPL-SCNC: 5.6 MMOL/L (ref 3.5–5.1)
RBC # BLD AUTO: 4.32 MIL/UL (ref 4.5–6)
SODIUM SERPL-SCNC: 139 MMOL/L (ref 136–145)
WBC NRBC COR # BLD AUTO: 15.3 K/UL (ref 4.3–11)

## 2020-12-28 RX ADMIN — DEXAMETHASONE SODIUM PHOSPHATE SCH MG: 10 INJECTION INTRAMUSCULAR; INTRAVENOUS at 09:05

## 2020-12-28 RX ADMIN — GABAPENTIN SCH MG: 300 CAPSULE ORAL at 12:47

## 2020-12-28 RX ADMIN — FERROUS SULFATE TAB 325 MG (65 MG ELEMENTAL FE) SCH MG: 325 (65 FE) TAB at 09:04

## 2020-12-28 RX ADMIN — GABAPENTIN SCH MG: 300 CAPSULE ORAL at 17:22

## 2020-12-28 RX ADMIN — Medication SCH MG: at 09:04

## 2020-12-28 RX ADMIN — Medication PRN TAB: at 05:07

## 2020-12-28 RX ADMIN — GABAPENTIN SCH MG: 300 CAPSULE ORAL at 09:04

## 2020-12-28 RX ADMIN — FERROUS SULFATE TAB 325 MG (65 MG ELEMENTAL FE) SCH MG: 325 (65 FE) TAB at 17:22

## 2020-12-28 RX ADMIN — AMLODIPINE BESYLATE SCH MG: 10 TABLET ORAL at 09:04

## 2020-12-28 RX ADMIN — SODIUM CHLORIDE PRN MLS/HR: 9 INJECTION, SOLUTION INTRAVENOUS at 05:09

## 2020-12-28 NOTE — NUR
RN DISCHARGE NOTES 



PATIENT DISCHARGED WITH TRANSPORTERS TO [facility]. PATIENT MEDICALLY STABLE A/O X 4. STABLE 
ON RA WITH BREATHING EVEN AND UNLABORED, NO SOB NOTED. NO SIGNS OF ACUTE DISTRESS. VITALS 
TAKEN WITH BP / 75, HR 96, O2 SAT 98%, RR 14. DISCHARGE PAPER WORK GIVEN TO 
TRANSPORTERS AS WELL AS BELONGINGS. IV REMOVED. MEDICAL BAND REMOVED. TELE MONITORS REMOVED.

## 2020-12-28 NOTE — NUR
RN NOTE

 

PATIENT REMOVING NASAL CANNULA. PER PT, HE FEELS FINE AND THINKS THAT HE DOESNT NEED THE 
OXYGEN. O2SAT CHECKED AT 97 %. DENIES ANY SOB. NO RESP DISTRESS NOTED. RR AT 18. WILL 
CONTINUE TO MONITOR.

## 2020-12-28 NOTE — NUR
RN CLOSING NOTES



PATIENT IN BED, ALERT AND VERBALLY RESPONSIVE, ABLE TO MAKE NEEDS KNOWN. PT TOLERATING 
WITHOUT O2. O2SAT AT 97 % ON RA. DENIES ANY SOB. NO RESP DISTRESS NOTED. PT SINUS TACHY HR 
108. COLOSTOMY BAG INTACT AND IN PLACE. BM EMPTIED. IVF NS INFUSING WELL, WITH NO SIGNS OF 
INFILTRATION. DENIES ANY PAIN AT IV SITE. ALL SAFETY MEASURES IMPLEMENTED PER PROTOCOL. SIDE 
RAILS UP X 2. CALL LIGHT WITHIN REACH. BED LOCKED IN LOWEST POSITION. WILL ENDORSE TO NEXT 
SHIFT NURSE FOR CAROLA.

## 2020-12-28 NOTE — NUR
TELE DISCHARGE NOTES



RECEIVED ORDERS FROM ELIJAH DIEGO. PT IN STABLE CONDITION FOR TRANSFER TO [facility]. PATIENT 
NOTIFIED OF DISCHARGE. CONTACTED FACILITY AND SPOKE WITH SASCHA OF PATIENT'S RETURN TO [facility]. 
MEDICATION, DISCHARGE INSTRUCTIONS AND EDUCATION PROVIDED TO PATIENT. BELONGING CHECKED. 
SKIN CHECK DONE, PICTURE TAKE OF OSTOMY SITE. NO FC IN PLACE. IV AND TELE MONITOR HAVE NOT 
BEEN REMOVED. ENDORSED TO NIGHT NURSE. SHE WILL F/U ON IV AND TELE REMOVAL UPON . PT 
ESTIMATED TIME OF  WAS 1900. PT IS RESTING COMFORTABLY IN BED AWAITING . 
ENDORSED TO NIGHT NURSE FOR CONTINUITY OF CARE.